# Patient Record
Sex: FEMALE | Race: WHITE | NOT HISPANIC OR LATINO | Employment: OTHER | URBAN - NONMETROPOLITAN AREA
[De-identification: names, ages, dates, MRNs, and addresses within clinical notes are randomized per-mention and may not be internally consistent; named-entity substitution may affect disease eponyms.]

---

## 2017-06-14 ENCOUNTER — TRANSCRIBE ORDERS (OUTPATIENT)
Dept: GENERAL RADIOLOGY | Facility: CLINIC | Age: 67
End: 2017-06-14

## 2017-06-14 DIAGNOSIS — M79.646 PAIN OF FINGER, UNSPECIFIED LATERALITY: Primary | ICD-10-CM

## 2017-06-23 ENCOUNTER — TRANSCRIBE ORDERS (OUTPATIENT)
Dept: GENERAL RADIOLOGY | Facility: CLINIC | Age: 67
End: 2017-06-23

## 2017-06-23 DIAGNOSIS — M79.646 PAIN OF FINGER, UNSPECIFIED LATERALITY: Primary | ICD-10-CM

## 2017-06-27 ENCOUNTER — TRANSCRIBE ORDERS (OUTPATIENT)
Dept: PHYSICAL THERAPY | Facility: HOSPITAL | Age: 67
End: 2017-06-27

## 2017-06-27 DIAGNOSIS — S69.91XA INJURY OF RIGHT INDEX FINGER, INITIAL ENCOUNTER: Primary | ICD-10-CM

## 2017-06-28 ENCOUNTER — HOSPITAL ENCOUNTER (OUTPATIENT)
Dept: PHYSICAL THERAPY | Facility: HOSPITAL | Age: 67
Setting detail: THERAPIES SERIES
Discharge: HOME | End: 2017-06-28

## 2017-06-28 DIAGNOSIS — S69.91XA INJURY OF RIGHT INDEX FINGER, INITIAL ENCOUNTER: Primary | ICD-10-CM

## 2017-06-28 PROCEDURE — 97162 PT EVAL MOD COMPLEX 30 MIN: CPT | Performed by: PHYSICAL THERAPIST

## 2017-06-28 PROCEDURE — 97110 THERAPEUTIC EXERCISES: CPT | Performed by: PHYSICAL THERAPIST

## 2017-06-28 NOTE — THERAPY EVALUATION
Outpatient Physical Therapy Hand Initial Evaluation   [facility]     Patient Name: Bushra Tidwell  : 1950  MRN: [id number]  Today's Date: 2017         Visit Date: 2017  Attendance:  (6 visits approved)  Subjective Improvement: n/a  Next MD Appt: 7/3/17  Recert Date: 17    Therapy Diagnosis: crush injury R IF    There is no problem list on file for this patient.       Past Medical History:   Diagnosis Date   • Arthritis     B knees   • Cancer 2015    skin cancer        Past Surgical History:   Procedure Laterality Date   • APPENDECTOMY     • BILATERAL BREAST REDUCTION Bilateral    • BREAST SURGERY Bilateral     breast reduction   • HERNIA REPAIR      6   • HYSTERECTOMY     • SKIN BIOPSY       Medications: fluoxetine    Visit Dx:    ICD-10-CM ICD-9-CM   1. Injury of right index finger, initial encounter S69.91XA 959.5             Patient History       17 0800          History    Chief Complaint Pain  -SS (r) BS (t) SS (c)      Type of Pain --   index finger  -SS (r) BS (t) SS (c)      Date Current Problem(s) Began --   2.5-3 weeks ago  -SS (r) BS (t) SS (c)      Brief Description of Current Complaint Patient got her R index finger caught in a hydraulic clamp at work 2.5-3 weeks ago. Wears splint constantly.  and lives in a single story home. Patient is currently off work and has been off work for the past 2.5 weeks. Has not been using ice or heat for pain management recently.  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 Hobbies: mow, outdoors, quilting, sewing  -SS (r) BS (t) SS (c)      What clinical tests have you had for this problem? X-ray  -SS (r) BS (t) SS (c)      Results of Clinical Tests negative  -SS (r) BS (t) SS (c)      Pain     Pain Location Finger (Comment which one)   R index  -SS (r) BS (t) SS (c)      Pain at Present 4  -SS (r) BS (t) SS (c)      Pain at Best 0  -SS (r) BS (t) SS (c)      Pain at Worst 8  -SS (r) BS (t) SS (c)      Pain Frequency Intermittent  -SS (r) BS (t) SS (c)      Pain Description Throbbing;Pins and needles  -SS (r) BS (t) SS (c)      What Performance Factors Make the Current Problem(s) WORSE? hitting her finger on something,   -SS (r) BS (t) SS (c)      What Performance Factors Make the Current Problem(s) BETTER? keeping finger above elbow  -SS (r) BS (t) SS (c)      Is your sleep disturbed? Yes  -SS (r) BS (t) SS (c)      Is medication used to assist with sleep? No  -SS (r) BS (t) SS (c)      Difficulties at work? yes  -SS (r) BS (t) SS (c)      Difficulties with ADL's? yes  -SS (r) BS (t) SS (c)      Difficulties with recreational activities? yes  -SS (r) BS (t) SS (c)      Fall Risk Assessment    Any falls in the past year: Yes  -SS (r) BS (t) SS (c)      Does patient have a fear of falling No  -SS (r) BS (t) SS (c)      Daily Activities    Primary Language English  -SS (r) BS (t) SS (c)      Safety    Are you being hurt, hit, or frightened by anyone at home or in your life? No  -SS (r) BS (t) SS (c)      Are you being neglected by a caregiver No  -SS (r) BS (t) SS (c)        User Key  (r) = Recorded By, (t) = Taken By, (c) = Cosigned By    Initials Name Provider Type    SS Gareth Penny, PT Physical Therapist    BS Mile Zuniga, PT Student PT Student             Hand Therapy (last 24 hours)      Hand Eval       06/28/17 0800          Splint Form    Splint Type Finger based  -SS (r) BS (t) SS (c)       Immobilized with PIPJ flexed;DIPJ flexed  -SS (r) BS (t) SS (c)      Select Digits Index   right  -SS (r) BS (t) SS (c)      Splint Purpose Immobilize affected area  -SS (r) BS (t) SS (c)      During (condition to wear splint) --   Continuous  -SS (r) BS (t) SS (c)      Use (daily wear) Continuous  -SS (r) BS (t) SS (c)      Splint Use (overall time to wear splint) Other (Comment)   MD time frame  -SS (r) BS (t) SS (c)      Left Extension AROM    II- MP AROM 10  -SS (r) BS (t) SS (c)      II- PIP AROM 10  -SS (r) BS (t) SS (c)      II- DIP AROM 15  -SS (r) BS (t) SS (c)      II- ARMENTA Left Extension AROM 35  -SS (r) BS (t)      Left Flexion AROM    II- MP AROM 85  -SS (r) BS (t) SS (c)      II- PIP AROM 105  -SS (r) BS (t) SS (c)      II- DIP AROM 75  -SS (r) BS (t) SS (c)      II- ARMENTA Left Flexion AROM 265  -SS (r) BS (t)      Right Extension AROM    II- MP AROM 20  -SS (r) BS (t) SS (c)      II- PIP AROM 10  -SS (r) BS (t) SS (c)      II- DIP AROM 5  -SS (r) BS (t) SS (c)      II- ARMENTA Right Extension AROM 35  -SS (r) BS (t)      Right Flexion AROM    II- MP AROM 30  -SS (r) BS (t) SS (c)      II- PIP AROM 10  -SS (r) BS (t) SS (c)      II- DIP AROM 15  -SS (r) BS (t) SS (c)      II- ARMENTA Right Flexion AROM 55  -SS (r) BS (t)      Hand  Strength     Strength Affected Side --   deferred  -SS (r) BS (t) SS (c)        User Key  (r) = Recorded By, (t) = Taken By, (c) = Cosigned By    Initials Name Provider Type    SS Gareth Penny, PT Physical Therapist    SIS Zuniga, PT Student PT Student              PT Ortho       06/28/17 0800    Subjective Comments    Subjective Comments See Patient History  -SS (r) BS (t) SS (c)    Precautions and Contraindications    Precautions/Limitations no known precautions/limitations  -SS (r) BS (t) SS (c)    Precautions none  -SS (r) BS (t) SS (c)    Contraindications none  -SS (r) BS (t) SS (c)    Subjective Pain    Able to rate subjective pain? yes  -SS (r) BS (t) SS  (c)    Pre-Treatment Pain Level 4  -SS (r) BS (t) SS (c)    Post-Treatment Pain Level 4  -SS    Posture/Observations    Observations Edema;Ecchymosis/bruising  -SS (r) BS (t) SS (c)    Posture/Observations Comments Edema and ecchymosis R IF. Bypasses R IF  -SS (r) BS (t) SS (c)    Sensation    Additional Comments WEST Monofilament Test: R - thumb 2g, IF 0.2g, LF-SF 0.07g; Left - Thumb 2g, IF-RF 0.07g, SF 0.2g  -SS (r) BS (t) SS (c)      User Key  (r) = Recorded By, (t) = Taken By, (c) = Cosigned By    Initials Name Provider Type    JOESPH Penny, PT Physical Therapist    SIS Zuniga, PT Student PT Student                          Therapy Education       06/28/17 0900          Therapy Education    Given HEP  -SS (r) BS (t) SS (c)      Program New   table tops, claw, AROM PIP/DIP flex/ext  -SS (r) BS (t) SS (c)      How Provided Verbal;Demonstration;Written  -SS (r) BS (t) SS (c)      Provided to Patient  -SS (r) BS (t) SS (c)      Level of Understanding Verbalized;Demonstrated  -SS (r) BS (t) SS (c)        User Key  (r) = Recorded By, (t) = Taken By, (c) = Cosigned By    Initials Name Provider Type    JOESPH Penny, PT Physical Therapist    SIS Zuniga, PT Student PT Student                PT OP Goals       06/28/17 1100       PT Short Term Goals    STG Date to Achieve 07/19/17  -SS (r) BS (t) SS (c)     STG 1 Tolerate 45 minute treatment session, no increase in pain.  -SS (r) BS (t) SS (c)     STG 1 Progress New  -SS (r) BS (t) SS (c)     STG 2 Subjective improvement 60% or greater.  -SS (r) BS (t) SS (c)     STG 2 Progress New  -SS (r) BS (t) SS (c)     STG 3 R index finger MP AROM to 70 deg or greater.  -SS     STG 3 Progress New  -SS (r) BS (t) SS (c)     STG 4 R index finger PIP AROM to 90 deg or greater.  -SS     STG 4 Progress New  -SS (r) BS (t) SS (c)     STG 5 R index finger DIP AROM to 50 deg or greater.  -SS (r) BS (t) SS (c)     STG 5 Progress New  -SS (r) BS (t) SS (c)      STG 6 R  strength within 5-10 lbs of L  strength.  -SS (r) BS (t) SS (c)     STG 6 Progress New  -SS (r) BS (t) SS (c)     STG 7 Quick DASH to 20% or less.  -SS (r) BS (t) SS (c)     STG 7 Progress New  -SS (r) BS (t) SS (c)     Long Term Goals    LTG Date to Achieve --   deferred at this time  -SS (r) BS (t) SS (c)     Time Calculation    PT Goal Re-Cert Due Date 07/19/17  -SS (r) BS (t) SS (c)       User Key  (r) = Recorded By, (t) = Taken By, (c) = Cosigned By    Initials Name Provider Type    SS Gareth Penny, PT Physical Therapist    SIS Zuniga, PT Student PT Student                PT Assessment/Plan       06/28/17 0800       PT Assessment    Functional Limitations Performance in self-care ADL;Performance in sport activities;Performance in work activities;Limitation in home management;Performance in leisure activities  -SS (r) BS (t) SS (c)     Impairments Dexterity;Edema;Joint mobility;Motor function;Muscle strength;Pain;Sensation  -SS (r) BS (t) SS (c)     Assessment Comments Patient presents to the clinic with edema and ecchymosis of the right index finger secondary to an injury. She lacks ROM in the digit and sensation is decreased as well. Patient would benefit from skilled therapy to increase ROM, decrease pain, and return to normal ADLs and work.  -SS (r) BS (t) SS (c)     Rehab Potential Good  -SS (r) BS (t) SS (c)     Patient/caregiver participated in establishment of treatment plan and goals Yes  -SS (r) BS (t) SS (c)     Patient would benefit from skilled therapy intervention Yes  -SS (r) BS (t) SS (c)     PT Plan    PT Frequency 2x/week  -SS (r) BS (t) SS (c)     Predicted Duration of Therapy Intervention (days/wks) 3-4 weeks  -SS (r) BS (t) SS (c)     Planned CPT's? PT EVAL LOW COMPLEXITY: 82501;PT SELF CARE/HOME MGMT/TRAIN EA 15: 70330;PT HOT OR COLD PACK TREAT [facility]ARE;PT PARAFFIN BATH: 60187  -SS (r) BS (t) SS (c)     Physical Therapy Interventions (Optional Details) fine  motor skills;home exercise program;manual therapy techniques;modalities;patient/family education;ROM (Range of Motion);strengthening  -SS (r) BS (t) SS (c)     PT Plan Comments Continue AROM exercises, fluidotherapy, possibly paraffin.  -SS (r) BS (t) SS (c)       User Key  (r) = Recorded By, (t) = Taken By, (c) = Cosigned By    Initials Name Provider Type    SS Gareth Penny, PT Physical Therapist    SSI Zuniga, PT Student PT Student                  Exercises       06/28/17 0800          Subjective Comments    Subjective Comments See Patient History  -SS (r) BS (t) SS (c)      Subjective Pain    Able to rate subjective pain? yes  -SS (r) BS (t) SS (c)      Pre-Treatment Pain Level 4  -SS (r) BS (t) SS (c)      Post-Treatment Pain Level 4  -SS      Exercise 1    Exercise Name 1 Fluidotherapy for desensitization and AROM  -SS (r) BS (t) SS (c)      Time (Minutes) 1 15 mins  -SS (r) BS (t) SS (c)      Exercise 2    Exercise Name 2 Table tops  -SS (r) BS (t) SS (c)      Cueing 2 Demo  -SS (r) BS (t) SS (c)      Sets 2 1  -SS (r) BS (t) SS (c)      Reps 2 10  -SS (r) BS (t) SS (c)      Exercise 3    Exercise Name 3 Claw  -SS (r) BS (t) SS (c)      Cueing 3 Demo  -SS (r) BS (t) SS (c)      Sets 3 1  -SS (r) BS (t) SS (c)      Reps 3 10  -SS (r) BS (t) SS (c)      Exercise 4    Exercise Name 4 AROM finger DIP flex/ext  -SS (r) BS (t) SS (c)      Cueing 4 Demo  -SS (r) BS (t) SS (c)      Sets 4 1  -SS (r) BS (t) SS (c)      Reps 4 10  -SS (r) BS (t) SS (c)      Exercise 5    Exercise Name 5 AROM finger PIP flex/ext  -SS (r) BS (t) SS (c)      Cueing 5 Demo  -SS (r) BS (t) SS (c)      Sets 5 1  -SS (r) BS (t) SS (c)      Reps 5 10  -SS (r) BS (t) SS (c)        User Key  (r) = Recorded By, (t) = Taken By, (c) = Cosigned By    Initials Name Provider Type    SS Gareth Penny, PT Physical Therapist    SIS Zuniga, PT Student PT Student                             Outcome Measures       06/28/17 0800           Quick DASH    Open a tight or new jar. 3  -SS (r) BS (t) SS (c)      Do heavy household chores (e.g., wash walls, wash floors) 3  -SS (r) BS (t) SS (c)      Carry a shopping bag or briefcase 3  -SS (r) BS (t) SS (c)      Wash your back 3  -SS (r) BS (t) SS (c)      Use a knife to cut food 3  -SS (r) BS (t) SS (c)      Recreational activities in which you take some force or impact through your arm, should or hand (e.g. golf, hammering, tennis, etc.) 5  -SS (r) BS (t) SS (c)      During the past week, to what extent has your arm, shoulder, or hand problem interfered with your normal social activities with family, friends, neighbors or groups? 4  -SS (r) BS (t) SS (c)      During the past week, were you limited in your work or other regular daily activities as a result of your arm, shoulder or hand problem? 4  -SS (r) BS (t) SS (c)      Arm, Shoulder, or hand pain 3  -SS (r) BS (t) SS (c)      Tingling (pins and needles) in your arm, shoulder, or hand 3  -SS (r) BS (t) SS (c)      During the past week, how much difficulty have you had sleeping because of the pain in your arm, shoulder or hand? 3  -SS (r) BS (t) SS (c)      Number of Questions Answered 11  -SS (r) BS (t) SS (c)      Quick DASH Score 59.09  -SS (r) BS (t)      Functional Assessment    Outcome Measure Options Quick DASH  -SS (r) BS (t) SS (c)        User Key  (r) = Recorded By, (t) = Taken By, (c) = Cosigned By    Initials Name Provider Type     Gareth Penny, PT Physical Therapist    SIS Zuniga, PT Student PT Student            Time Calculation:   Start Time: 0804  Stop Time: 0905  Time Calculation (min): 61 min     Therapy Charges for Today     Code Description Service Date Service Provider Modifiers Qty    30483481466 HC PT EVAL MOD COMPLEXITY 3 6/28/2017 Gareth Penny, PT GP 1    73880669805 HC PT THER PROC EA 15 MIN 6/28/2017 Gareth Penny, PT GP 1                   Gareth Penny, PT  6/28/2017

## 2017-07-05 ENCOUNTER — HOSPITAL ENCOUNTER (OUTPATIENT)
Dept: PHYSICAL THERAPY | Facility: HOSPITAL | Age: 67
Setting detail: THERAPIES SERIES
Discharge: HOME | End: 2017-07-05

## 2017-07-05 DIAGNOSIS — S69.91XA INJURY OF RIGHT INDEX FINGER, INITIAL ENCOUNTER: Primary | ICD-10-CM

## 2017-07-05 PROCEDURE — 97110 THERAPEUTIC EXERCISES: CPT

## 2017-07-05 NOTE — THERAPY TREATMENT NOTE
Outpatient Physical Therapy Ortho Treatment Note  [facility]     Patient Name: Bushra Tidwell  : 1950  MRN: [id number]  Today's Date: 2017      Visit Date: 2017      Subjective Improvement 25%  Visits 2/2  Visits Approved 6  RTMD 7-  Recert Date 2017    Right Index Finger Injury    Visit Dx:    ICD-10-CM ICD-9-CM   1. Injury of right index finger, initial encounter S69.91XA 959.5       There is no problem list on file for this patient.       Past Medical History:   Diagnosis Date   • Arthritis     B knees   • Cancer 2015    skin cancer        Past Surgical History:   Procedure Laterality Date   • APPENDECTOMY     • BILATERAL BREAST REDUCTION Bilateral    • BREAST SURGERY Bilateral     breast reduction   • HERNIA REPAIR      6   • HYSTERECTOMY     • SKIN BIOPSY                               PT Assessment/Plan       17 1110       PT Assessment    Assessment Comments appears compliant with HEP  -CP     PT Plan    PT Frequency 2x/week  -CP     Predicted Duration of Therapy Intervention (days/wks) 4 weeks  -CP     PT Plan Comments Cont with POC  -CP       User Key  (r) = Recorded By, (t) = Taken By, (c) = Cosigned By    Initials Name Provider Type    CP Sarai Hay PTA Physical Therapy Assistant                Modalities       17 0900          Subjective Comments    Subjective Comments Patient states that she is not to wear the splint, only if her finger starts to swell or it becomes painful.  -CP      Subjective Pain    Able to rate subjective pain? yes  -CP      Pre-Treatment Pain Level 3  -CP      Parrafin    Paraffin [id number] Location right hand  -CP      Rx Minutes 15  -CP        User Key  (r) = Recorded By, (t) = Taken By, (c) = Cosigned By    Initials Name Provider Type    CP Sarai Hay PTA Physical Therapy Assistant                Exercises       17 0900          Subjective Comments    Subjective Comments Patient states that she is not to wear  the splint, only if her finger starts to swell or it becomes painful.  -CP      Subjective Pain    Able to rate subjective pain? yes  -CP      Pre-Treatment Pain Level 3  -CP      Exercise 1    Exercise Name 1 Table tops  -CP      Reps 1 20  -CP      Exercise 2    Exercise Name 2 claw  -CP      Reps 2 20  -CP      Exercise 3    Exercise Name 3 Composite fist  -CP      Reps 3 20  -CP      Exercise 4    Exercise Name 4 Finger ext off of table  -CP      Sets 4 2  -CP      Reps 4 10  -CP      Exercise 5    Exercise Name 5 In and out  -CP      Reps 5 20  -CP      Exercise 6    Exercise Name 6 Ball squeezes  -CP      Sets 6 2  -CP      Reps 6 10  -CP      Exercise 7    Exercise Name 7 AROM finger fl  -CP      Sets 7 2  -CP      Reps 7 10  -CP      Exercise 8    Exercise Name 8 Fluido  -CP      Time (Minutes) 8 15  -CP        User Key  (r) = Recorded By, (t) = Taken By, (c) = Cosigned By    Initials Name Provider Type    CP Sarai Hay PTA Physical Therapy Assistant                        Manual Rx (last 36 hours)      Manual Treatments       07/05/17 1100          Manual Rx 1    Manual Rx 1 Location PROM right index finger fl all jts  -CP      Manual Rx 1 Duration 8  -CP        User Key  (r) = Recorded By, (t) = Taken By, (c) = Cosigned By    Initials Name Provider Type    CP Sarai Hay PTA Physical Therapy Assistant                PT OP Goals       07/05/17 1100       PT Short Term Goals    STG Date to Achieve 07/19/17  -CP     STG 1 Tolerate 45 minute treatment session, no increase in pain.  -CP     STG 1 Progress Progressing  -CP     STG 2 Subjective improvement 60% or greater.  -CP     STG 2 Progress Not Met  -CP     STG 3 R index finger MP AROM to 70 deg or greater.  -CP     STG 3 Progress Progressing  -CP     STG 4 R index finger PIP AROM to 90 deg or greater.  -CP     STG 4 Progress Progressing  -CP     STG 5 R index finger DIP AROM to 50 deg or greater.  -CP     STG 5 Progress Progressing  -CP     STG  6 R  strength within 5-10 lbs of L  strength.  -CP     STG 6 Progress Not Met  -CP     STG 7 Quick DASH to 20% or less.  -CP     STG 7 Progress Not Met  -CP     Long Term Goals    LTG Date to Achieve --   deferred at this time  -CP     Time Calculation    PT Goal Re-Cert Due Date 07/19/17  -CP       User Key  (r) = Recorded By, (t) = Taken By, (c) = Cosigned By    Initials Name Provider Type    CP Sarai Hay PTA Physical Therapy Assistant                Therapy Education       07/05/17 1103          Therapy Education    Given HEP   finger ext off of table, table top, clas, composite fist, ball squeezes  -CP      Program Reinforced  -CP      How Provided Verbal;Demonstration;Written  -CP      Provided to Patient  -CP      Level of Understanding Verbalized;Demonstrated  -CP        User Key  (r) = Recorded By, (t) = Taken By, (c) = Cosigned By    Initials Name Provider Type    CP Sarai Hay PTA Physical Therapy Assistant                Time Calculation:   Start Time: 1015  Stop Time: 1105  Time Calculation (min): 50 min  Total Timed Code Minutes- PT: 50 minute(s)    Therapy Charges for Today     Code Description Service Date Service Provider Modifiers Qty    08679516005 HC PT THER PROC EA 15 MIN 7/5/2017 Sarai Hay PTA GP 3                    Sarai Hay PTA  7/5/2017

## 2017-07-07 ENCOUNTER — HOSPITAL ENCOUNTER (OUTPATIENT)
Dept: PHYSICAL THERAPY | Facility: HOSPITAL | Age: 67
Setting detail: THERAPIES SERIES
Discharge: HOME | End: 2017-07-07

## 2017-07-07 DIAGNOSIS — S69.91XA INJURY OF RIGHT INDEX FINGER, INITIAL ENCOUNTER: Primary | ICD-10-CM

## 2017-07-07 PROCEDURE — 97018 PARAFFIN BATH THERAPY: CPT

## 2017-07-07 PROCEDURE — 97110 THERAPEUTIC EXERCISES: CPT

## 2017-07-07 NOTE — THERAPY TREATMENT NOTE
Outpatient Physical Therapy Ortho Treatment Note  [facility]     Patient Name: Bushra Tidwell  : 1950  MRN: [id number]  Today's Date: 2017      Visit Date: 2017     Subjective Improvement: 30%  Attendance:  3/3 ( 6 visits approved)  Next MD Visit : 07/10/17  Recert Date:  17      Therapy Diagnosis:  R index Finger Injury          Visit Dx:    ICD-10-CM ICD-9-CM   1. Injury of right index finger, initial encounter S69.91XA 959.5       There is no problem list on file for this patient.       Past Medical History:   Diagnosis Date   • Arthritis     B knees   • Cancer 2015    skin cancer        Past Surgical History:   Procedure Laterality Date   • APPENDECTOMY     • BILATERAL BREAST REDUCTION Bilateral    • BREAST SURGERY Bilateral     breast reduction   • HERNIA REPAIR      6   • HYSTERECTOMY     • SKIN BIOPSY               PT Ortho       17 1500    Right Fingers    MCP Flexion AROM Deficit --  -KH    PIP Flexion AROM Deficit --  -KH    DIP Flexion AROM Deficit --  -      17 1415    Precautions and Contraindications    Precautions/Limitations no known precautions/limitations  -    Right Fingers    MCP Flexion AROM Deficit 95 Index finger  -KH    PIP Flexion AROM Deficit 95 Index Finger  -KH    DIP Flexion AROM Deficit 45 Index Finger  -KH    Hand    Hand Manual Muscle Testing Detail  R 32#; L 44#  -      User Key  (r) = Recorded By, (t) = Taken By, (c) = Cosigned By    Initials Name Provider Type    SERAFIN Morris PTA Physical Therapy Assistant                            PT Assessment/Plan       17 1415       PT Assessment    Assessment Comments velcro block pinch tiring to patient and had minimal pain at the end but pain decreases once activities stopped.   -     PT Plan    PT Frequency 2x/week  -     Predicted Duration of Therapy Intervention (days/wks) 4 weeks  -     PT Plan Comments Check  strength next; Continue with PT POC; Increase AROM ans  strength in the right hand as able.   -       User Key  (r) = Recorded By, (t) = Taken By, (c) = Cosigned By    Initials Name Provider Type    SERAFIN Morris PTA Physical Therapy Assistant                Modalities       07/07/17 1415          Parlaura    Paraffin [id number] Location Right Hand  -KH      Rx Minutes 12'  -KH        User Key  (r) = Recorded By, (t) = Taken By, (c) = Cosigned By    Initials Name Provider Type    SERAFIN Morris PTA Physical Therapy Assistant                Exercises       07/07/17 1415          Subjective Comments    Subjective Comments Finger still smarts with pressure is appplied to that jt in the right finger; pt reports that her finger is getting better  -      Subjective Pain    Able to rate subjective pain? yes  -      Pre-Treatment Pain Level 2  -KH      Exercise 1    Exercise Name 1 table tops  -KH      Reps 1 20  -KH      Exercise 2    Exercise Name 2 claw  -KH      Reps 2 20  -KH      Exercise 3    Exercise Name 3 composite fist  -KH      Reps 3 20  -KH      Exercise 4    Exercise Name 4 velcro block pinch & pull  -KH      Sets 4 1  -KH      Exercise 5    Exercise Name 5 web   -KH      Resistance 5 Red  -KH      Time (Minutes) 5 3'  -KH      Exercise 6    Exercise Name 6 PEG board  -KH      Time (Minutes) 6 5'  -KH      Exercise 7    Exercise Name 7 Velcro Lg roll turn  -KH      Sets 7 1  -KH      Reps 7 10  -KH      Exercise 8    Exercise Name 8 Velcro Key Turn  -KH      Sets 8 1  -KH      Reps 8 10  -KH        User Key  (r) = Recorded By, (t) = Taken By, (c) = Cosigned By    Initials Name Provider Type    SERAFIN Morris PTA Physical Therapy Assistant                               PT OP Goals       07/07/17 1415       PT Short Term Goals    STG Date to Achieve 07/19/17  -     STG 1 Tolerate 45 minute treatment session, no increase in pain.  -     STG 1 Progress Progressing  -     STG 2 Subjective improvement 60% or greater.  -     STG 2 Progress Not Met  -      STG 3 R index finger MP AROM to 70 deg or greater.  -     STG 3 Progress Met  -     STG 4 R index finger PIP AROM to 90 deg or greater.  -     STG 4 Progress Met  -     STG 5 R index finger DIP AROM to 50 deg or greater.  -     STG 5 Progress Progressing  -     STG 6 R  strength within 5-10 lbs of L  strength.  -     STG 6 Progress Not Met  -     STG 7 Quick DASH to 20% or less.  -     STG 7 Progress Not Met  -     Long Term Goals    LTG Date to Achieve --   deferred at this time  -     Time Calculation    PT Goal Re-Cert Due Date 07/19/17  -       User Key  (r) = Recorded By, (t) = Taken By, (c) = Cosigned By    Initials Name Provider Type    SERAFIN Morris PTA Physical Therapy Assistant                    Time Calculation:   Start Time: 1415  Stop Time: 1500  Time Calculation (min): 45 min  Total Timed Code Minutes- PT: 45 minute(s)    Therapy Charges for Today     Code Description Service Date Service Provider Modifiers Qty    [id number]  PT THER PROC EA 15 MIN 7/7/2017 Olivia Morris PTA GP 2    [id number] HC PT PARAFFIN BATH 7/7/2017 Olivia Morris PTA GP 1                    Olivia Morris PTA  7/7/2017

## 2017-07-11 ENCOUNTER — HOSPITAL ENCOUNTER (OUTPATIENT)
Dept: PHYSICAL THERAPY | Facility: HOSPITAL | Age: 67
Setting detail: THERAPIES SERIES
End: 2017-07-11

## 2017-07-12 ENCOUNTER — HOSPITAL ENCOUNTER (OUTPATIENT)
Dept: PHYSICAL THERAPY | Facility: HOSPITAL | Age: 67
Setting detail: THERAPIES SERIES
Discharge: HOME | End: 2017-07-12

## 2017-07-12 DIAGNOSIS — S69.91XA INJURY OF RIGHT INDEX FINGER, INITIAL ENCOUNTER: Primary | ICD-10-CM

## 2017-07-12 PROCEDURE — 97110 THERAPEUTIC EXERCISES: CPT

## 2017-07-12 NOTE — THERAPY TREATMENT NOTE
Outpatient Physical Therapy Ortho Treatment Note  [facility]     Patient Name: Bushra Tidwell  : 1950  MRN: [id number]  Today's Date: 2017      Visit Date: 2017     Subjective Improvement 50%  Visits 4/4  Visits approved 6  RTMD 2017  recert [id number]    Right Index finger injury    Visit Dx:    ICD-10-CM ICD-9-CM   1. Injury of right index finger, initial encounter S69.91XA 959.5       There is no problem list on file for this patient.       Past Medical History:   Diagnosis Date   • Arthritis     B knees   • Cancer 2015    skin cancer        Past Surgical History:   Procedure Laterality Date   • APPENDECTOMY     • BILATERAL BREAST REDUCTION Bilateral    • BREAST SURGERY Bilateral     breast reduction   • HERNIA REPAIR      6   • HYSTERECTOMY     • SKIN BIOPSY              Hand Therapy (last 24 hours)      Hand Eval       17 1600          Hand  Strength     Strength Affected Side Right  -CP       Strength Right    # Reps 3  -CP      Right  Test 1 24  -CP      Right  Test 2 22  -CP      Right  Test 3 24  -CP       Strength Average Right 23.33  -CP        User Key  (r) = Recorded By, (t) = Taken By, (c) = Cosigned By    Initials Name Provider Type    CP Sarai Hay PTA Physical Therapy Assistant                          PT Assessment/Plan       17 1656       PT Assessment    Assessment Comments no increase pain with therapy this date.  Patient appears to be progressing nicely  -CP     PT Plan    PT Frequency 2x/week  -CP     Predicted Duration of Therapy Intervention (days/wks) 1 week  -CP     PT Plan Comments Recheck scheduled for next week.  will need to request additional visits.  take AROM need visits.  Box carry  -CP       User Key  (r) = Recorded By, (t) = Taken By, (c) = Cosigned By    Initials Name Provider Type    CP Sarai Hay PTA Physical Therapy Assistant                Modalities       17 1500           [facility] [id number] Location right hand   -CP      Rx Minutes 12  -CP        User Key  (r) = Recorded By, (t) = Taken By, (c) = Cosigned By    Initials Name Provider Type    CP Sarai Hay PTA Physical Therapy Assistant                Exercises       07/12/17 1500          Subjective Comments    Subjective Comments Patient went to MD yesterday and MD felt that she would be ready to return to workin 2 weeks.  He will let PT make that decision.  -CP      Subjective Pain    Able to rate subjective pain? yes  -CP      Pre-Treatment Pain Level 1  -CP      Post-Treatment Pain Level 0  -CP      Exercise 1    Exercise Name 1 table tops  -CP      Reps 1 20  -CP      Exercise 2    Exercise Name 2 Claw  -CP      Reps 2 20  -CP      Exercise 3    Exercise Name 3 Table tops  -CP      Reps 3 20  -CP      Exercise 4    Exercise Name 4 Composite fist  -CP      Reps 4 20  -CP      Exercise 5    Exercise Name 5 Velcro large roll and ket  -CP      Sets 5 1  -CP      Reps 5 10  -CP      Exercise 6    Exercise Name 6 box screws  -CP      Time (Minutes) 6 3  -CP      Exercise 7    Exercise Name 7 composit fist  -CP      Equipment 7 Theraputty  -CP      Resistance 7 Yellow  -CP      Reps 7 30  -CP      Exercise 8    Exercise Name 8 Beans and rice  -CP      Time (Minutes) 8 4  -CP        User Key  (r) = Recorded By, (t) = Taken By, (c) = Cosigned By    Initials Name Provider Type    CP Sarai Hay PTA Physical Therapy Assistant                               PT OP Goals       07/12/17 1600       PT Short Term Goals    STG Date to Achieve 07/19/17  -CP     STG 1 Tolerate 45 minute treatment session, no increase in pain.  -CP     STG 1 Progress Met  -CP     STG 2 Subjective improvement 60% or greater.  -CP     STG 2 Progress Not Met  -CP     STG 3 R index finger MP AROM to 70 deg or greater.  -CP     STG 3 Progress Met  -CP     STG 4 R index finger PIP AROM to 90 deg or greater.  -CP     STG 4 Progress Met  -CP     STG 5 R  index finger DIP AROM to 50 deg or greater.  -CP     STG 5 Progress Progressing  -CP     STG 6 R  strength within 5-10 lbs of L  strength.  -CP     STG 6 Progress Not Met  -CP     STG 7 Quick DASH to 20% or less.  -CP     STG 7 Progress Not Met  -CP     Long Term Goals    LTG Date to Achieve --   deferred at this time  -CP     Time Calculation    PT Goal Re-Cert Due Date 07/19/17  -CP       User Key  (r) = Recorded By, (t) = Taken By, (c) = Cosigned By    Initials Name Provider Type    CP Sarai Hay PTA Physical Therapy Assistant                Therapy Education       07/12/17 1655          Therapy Education    Given HEP   composite  with yellow putty  -CP      Program New  -CP      How Provided Verbal;Demonstration  -CP      Provided to Patient  -CP      Level of Understanding Verbalized;Demonstrated  -CP        User Key  (r) = Recorded By, (t) = Taken By, (c) = Cosigned By    Initials Name Provider Type    CP Sarai Hay PTA Physical Therapy Assistant                Time Calculation:   Start Time: 1555  Stop Time: 1645  Time Calculation (min): 50 min  Total Timed Code Minutes- PT: 50 minute(s)    Therapy Charges for Today     Code Description Service Date Service Provider Modifiers Qty    42715844265 HC PT THER PROC EA 15 MIN 7/12/2017 Sarai Hay PTA GP 3                    Sarai Hay PTA  7/12/2017

## 2017-07-13 ENCOUNTER — HOSPITAL ENCOUNTER (OUTPATIENT)
Dept: PHYSICAL THERAPY | Facility: HOSPITAL | Age: 67
Setting detail: THERAPIES SERIES
Discharge: HOME | End: 2017-07-13

## 2017-07-13 DIAGNOSIS — S69.91XA INJURY OF RIGHT INDEX FINGER, INITIAL ENCOUNTER: Primary | ICD-10-CM

## 2017-07-13 PROCEDURE — 97110 THERAPEUTIC EXERCISES: CPT

## 2017-07-13 PROCEDURE — 97018 PARAFFIN BATH THERAPY: CPT

## 2017-07-13 NOTE — THERAPY TREATMENT NOTE
Outpatient Physical Therapy Ortho Treatment Note  [facility]     Patient Name: Bushra Tidwell  : 1950  MRN: [id number]  Today's Date: 2017      Visit Date: 2017     Subjective Improvement: 50%  Attendance:  5/5 (6 visits approved)  Next MD Visit : 2017   Recert Date:  2017      Therapy Diagnosis:  Injury of R index finger, initial encounter        Visit Dx:    ICD-10-CM ICD-9-CM   1. Injury of right index finger, initial encounter S69.91XA 959.5       There is no problem list on file for this patient.       Past Medical History:   Diagnosis Date   • Arthritis     B knees   • Cancer 2015    skin cancer        Past Surgical History:   Procedure Laterality Date   • APPENDECTOMY     • BILATERAL BREAST REDUCTION Bilateral    • BREAST SURGERY Bilateral     breast reduction   • HERNIA REPAIR      6   • HYSTERECTOMY     • SKIN BIOPSY              Hand Therapy (last 24 hours)      Hand Eval       17 1558          Subjective Pain    Post-Treatment Pain Level (P)  3  -MO      Right Extension AROM    II- MP AROM (P)  0  -MO      II- PIP AROM (P)  0  -MO      II- DIP AROM (P)  -5  -MO      II- ARMENTA Right Extension AROM (P)  -5  -MO      Right Flexion AROM    II- MP AROM (P)  90  -MO      II- PIP AROM (P)  55  -MO      II- DIP AROM (P)  30  -MO      II- ARMENTA Right Flexion AROM (P)  175  -MO      Hand  Strength     Strength Affected Side (P)  Right;Left  -MO       Strength Right    # Reps (P)  3  -MO      Right  Test 1 (P)  33  -MO      Right  Test 2 (P)  30  -MO      Right  Test 3 (P)  28  -MO       Strength Average Right (P)  30.33  -MO       Strength Left    # Reps (P)  3  -MO      Left  Test 1 (P)  45  -MO      Left  Test 2 (P)  50  -MO      Left  Test 3 (P)  43  -MO       Strength Average Left (P)  46  -MO        User Key  (r) = Recorded By, (t) = Taken By, (c) = Cosigned By    Initials Name Provider Type    JOS Baca, PTA  Student PTA Student                          PT Assessment/Plan       07/13/17 1558 07/12/17 1656    PT Assessment    Assessment Comments (P)  Pt tolerated treatment well.  Pt had some pain toward the end of the pinching exercises.  Pt experienced some fatigue   -MO no increase pain with therapy this date.  Patient appears to be progressing nicely  -CP    PT Plan    PT Frequency (P)  2x/week  -MO 2x/week  -CP    Predicted Duration of Therapy Intervention (days/wks) (P)  1 week  -MO 1 week  -CP    PT Plan Comments (P)  Request additional visits next week.  Check and work on  strength.  -MO Recheck scheduled for next week.  will need to request additional visits.  take AROM need visits.  Box carry  -CP      User Key  (r) = Recorded By, (t) = Taken By, (c) = Cosigned By    Initials Name Provider Type    CP Sarai Hay PTA Physical Therapy Assistant    JOS Baca, SHARMIN Student PTA Student                Modalities       07/13/17 1558          [facility] [address] Location (P)  R hand  -MO      Rx Minutes (P)  15  -MO        User Key  (r) = Recorded By, (t) = Taken By, (c) = Cosigned By    Initials Name Provider Type    JOS Baca, PTA Student PTA Student                Exercises       07/13/17 1558 07/13/17 1500       Subjective Comments    Subjective Comments (P)  Pt states she is in some pain today but not enough to take a pain pill  -MO      Subjective Pain    Able to rate subjective pain? (P)  yes  -MO (P)  --  -MO     Pre-Treatment Pain Level (P)  4  -MO      Post-Treatment Pain Level (P)  3  -MO      Exercise 1    Exercise Name 1 (P)  table tops  -MO      Reps 1 (P)  20  -MO      Exercise 2    Exercise Name 2 (P)  claw  -MO      Reps 2 (P)  20  -MO      Exercise 3    Exercise Name 3 (P)  composite fist  -MO      Reps 3 (P)  20  -MO      Exercise 4    Exercise Name 4 (P)  cards 3 pinch  -MO      Reps 4 (P)  20  -MO      Exercise 5    Exercise Name 5 (P)  velcro large roll  -MO       Sets 5 (P)  1  -MO      Reps 5 (P)  10  -MO      Exercise 6    Exercise Name 6 (P)  Clothes pin w/ pink color 3 pinch  -MO      Sets 6 (P)  2  -MO      Reps 6 (P)  10  -MO      Exercise 7    Exercise Name 7 (P)  Purdue peg board  -MO      Time (Minutes) 7 (P)  5  -MO        User Key  (r) = Recorded By, (t) = Taken By, (c) = Cosigned By    Initials Name Provider Type    JOS Baca PTA Student PTA Student                               PT OP Goals       07/13/17 1558       PT Short Term Goals    STG Date to Achieve (P)  07/19/17  -MO     STG 1 (P)  Tolerate 45 minute treatment session, no increase in pain.  -MO     STG 1 Progress (P)  Met  -MO     STG 2 (P)  Subjective improvement 60% or greater.  -MO     STG 2 Progress (P)  Not Met  -MO     STG 3 (P)  R index finger MP AROM to 70 deg or greater.  -MO     STG 3 Progress (P)  Met  -MO     STG 4 (P)  R index finger PIP AROM to 90 deg or greater.  -MO     STG 4 Progress (P)  Met  -MO     STG 5 (P)  R index finger DIP AROM to 50 deg or greater.  -MO     STG 5 Progress (P)  Progressing  -MO     STG 6 (P)  R  strength within 5-10 lbs of L  strength.  -MO     STG 6 Progress (P)  Not Met  -MO     STG 7 (P)  Quick DASH to 20% or less.  -MO     STG 7 Progress (P)  Not Met  -MO     Long Term Goals    LTG Date to Achieve (P)  --   deferred at this time  -MO     Time Calculation    PT Goal Re-Cert Due Date (P)  07/19/17  -MO       User Key  (r) = Recorded By, (t) = Taken By, (c) = Cosigned By    Initials Name Provider Type    JOS Baca PTA Student PTA Student                Therapy Education       07/12/17 1655          Therapy Education    Given HEP   composite  with yellow putty  -CP      Program New  -CP      How Provided Verbal;Demonstration  -CP      Provided to Patient  -CP      Level of Understanding Verbalized;Demonstrated  -CP        User Key  (r) = Recorded By, (t) = Taken By, (c) = Cosigned By    Initials Name Provider Type    CP Sarai  BETSY Hay PTA Physical Therapy Assistant                Time Calculation:   Start Time: (P) 1558  Stop Time: (P) 1644  Time Calculation (min): (P) 46 min  Total Timed Code Minutes- PT: (P) 46 minute(s)    Therapy Charges for Today     Code Description Service Date Service Provider Modifiers Qty    99796359179 HC PT PARAFFIN BATH 7/13/2017 Stefania Baca PTA Student GP 1    01306304929 HC PT THER PROC EA 15 MIN 7/13/2017 Stefania Baca PTA Student GP 2                    Stefania Baca PTA Student  7/13/2017

## 2017-07-19 ENCOUNTER — HOSPITAL ENCOUNTER (OUTPATIENT)
Dept: PHYSICAL THERAPY | Facility: HOSPITAL | Age: 67
Setting detail: THERAPIES SERIES
Discharge: HOME | End: 2017-07-19

## 2017-07-19 DIAGNOSIS — S69.91XA INJURY OF RIGHT INDEX FINGER, INITIAL ENCOUNTER: Primary | ICD-10-CM

## 2017-07-19 PROCEDURE — 97018 PARAFFIN BATH THERAPY: CPT | Performed by: PHYSICAL THERAPIST

## 2017-07-19 PROCEDURE — 97110 THERAPEUTIC EXERCISES: CPT | Performed by: PHYSICAL THERAPIST

## 2017-07-20 NOTE — THERAPY PROGRESS REPORT/RE-CERT
Outpatient Physical Therapy Hand Progress Note   [facility]     Patient Name: Bushra Tidwell  : 1950  MRN: [id number]  Today's Date: 2017         Visit Date: 2017  Attendance:  (6 visits approved)  Subjective Improvement: 80%  Next MD Appt: 17  Recert Date: 17     Therapy Diagnosis: crush injury R IF     There is no problem list on file for this patient.       Past Medical History:   Diagnosis Date   • Arthritis     B knees   • Cancer 2015    skin cancer        Past Surgical History:   Procedure Laterality Date   • APPENDECTOMY     • BILATERAL BREAST REDUCTION Bilateral    • BREAST SURGERY Bilateral     breast reduction   • HERNIA REPAIR      6   • HYSTERECTOMY     • SKIN BIOPSY           Visit Dx:    ICD-10-CM ICD-9-CM   1. Injury of right index finger, initial encounter S69.91XA 959.5     Changes in Medications: levofloxacin added to medication list  Changes in MD Orders: none noted  Number of Work Days Lost: none           Hand Therapy (last 24 hours)      Hand Eval       17 1500          Subjective Comments    Subjective Comments Saw Dr. Lancaster yesterday. Goes back to him on 17. Continued restrictions due to concern about ability to use knife to remove glue from headliner. Movement is much better than when therapy started. Able to use hand normally at home. Catches herself babying the right index finger. Has been prescribed an levofloxacin for a kidney infection. 80% subjective improvement  -SS      Subjective Pain    Able to rate subjective pain? yes  -SS      Pre-Treatment Pain Level 0  -SS      Right Extension AROM    II- MP AROM 15  -SS      II- PIP AROM 15  -SS      II- DIP AROM 5  -SS      II- ARMENTA Right Extension AROM 35  -SS      Right Flexion AROM    II- MP AROM 95  -SS      II- PIP AROM 105  -SS      II- DIP AROM 65  -SS      II- ARMENTA Right Flexion AROM 265  -SS       Strength Right    # Reps 3  -SS      Right Rung 2  -SS      Right  Test 1 40   -SS      Right  Test 2 30  -SS      Right  Test 3 35  -SS       Strength Average Right 35  -SS       Strength Left    # Reps 3  -SS      Left Rung 2  -SS      Left  Test 1 50  -SS      Left  Test 2 45  -SS      Left  Test 3 50  -SS       Strength Average Left 48.33  -SS        User Key  (r) = Recorded By, (t) = Taken By, (c) = Cosigned By    Initials Name Provider Type    JOESPH Penny, PT Physical Therapist              PT Ortho       07/19/17 1600    Special Tests/Palpation    Special Tests/Palpation --  -SS      07/19/17 1500    Special Tests/Palpation    Special Tests/Palpation --   slight TTP dorsal PIP and along med and lat finger  -SS      User Key  (r) = Recorded By, (t) = Taken By, (c) = Cosigned By    Initials Name Provider Type    JOESPH Penny, PT Physical Therapist                          Therapy Education       07/19/17 1600          Therapy Education    Given HEP  -SS      Program Progressed   dispensed red putty  -SS      How Provided Verbal;Demonstration  -SS      Provided to Patient  -SS      Level of Understanding Verbalized;Demonstrated  -SS        User Key  (r) = Recorded By, (t) = Taken By, (c) = Cosigned By    Initials Name Provider Type    JOESPH Penny, PT Physical Therapist                PT OP Goals       07/19/17 1500       PT Short Term Goals    STG Date to Achieve 07/19/17  -SS     STG 1 Tolerate 45 minute treatment session, no increase in pain.  -SS     STG 1 Progress Met  -SS     STG 2 Subjective improvement 60% or greater.  -SS     STG 2 Progress Met  -SS     STG 3 R index finger MP AROM to 70 deg or greater.  -SS     STG 3 Progress Met  -SS     STG 4 R index finger PIP AROM to 90 deg or greater.  -SS     STG 4 Progress Met  -SS     STG 5 R index finger DIP AROM to 50 deg or greater.  -SS     STG 5 Progress Met  -SS     STG 6 R  strength within 5-10 lbs of L  strength.  -SS     STG 6 Progress Not Met  -SS      STG 7 Quick DASH to 20% or less.  -     STG 7 Progress Not Met  -     Long Term Goals    LTG Date to Achieve --   deferred at this time  -     Time Calculation    PT Goal Re-Cert Due Date 08/09/17  -       User Key  (r) = Recorded By, (t) = Taken By, (c) = Cosigned By    Initials Name Provider Type     Gareth Penny, PT Physical Therapist                PT Assessment/Plan       07/19/17 1600       PT Assessment    Functional Limitations Performance in self-care ADL;Performance in sport activities;Performance in work activities;Limitation in home management;Performance in leisure activities  -     Impairments Dexterity;Edema;Joint mobility;Motor function;Muscle strength;Pain;Sensation  -     Assessment Comments Advanced putty to red for HEP. Patient was given a compression sleeve for support of R IF. Sore with work simulation activities.   -     Rehab Potential Good  -     Patient/caregiver participated in establishment of treatment plan and goals Yes  -SS     Patient would benefit from skilled therapy intervention Yes  -SS     PT Plan    PT Frequency 2x/week  -     Predicted Duration of Therapy Intervention (days/wks) 2-3 weeks  -     PT Plan Comments Hand strengthening and work simulation activities  -       User Key  (r) = Recorded By, (t) = Taken By, (c) = Cosigned By    Initials Name Provider Type     Gareth Penny, PT Physical Therapist                Modalities       07/19/17 1500          Subjective Pain    Post-Treatment Pain Level 0  -      Subjective Pain Comment R hand feels fatigued after treatment.  -      [place]    [place] [address] Location R hand  -      Rx Minutes 10  -        User Key  (r) = Recorded By, (t) = Taken By, (c) = Cosigned By    Initials Name Provider Type     Gareth Penny, PT Physical Therapist              Exercises       07/19/17 1500          Subjective Comments    Subjective Comments Saw Dr. Lancaster yesterday. Goes back to  him on 7/26/17. Continued restrictions due to concern about ability to use knife to remove glue from headliner. Movement is much better than when therapy started. Able to use hand normally at home. Catches herself babying the right index finger. Has been prescribed an levofloxacin for a kidney infection. 80% subjective improvement  -SS      Subjective Pain    Able to rate subjective pain? yes  -SS      Pre-Treatment Pain Level 0  -SS      Post-Treatment Pain Level 0  -SS      Subjective Pain Comment R hand feels fatigued after treatment.  -SS      Exercise 1    Exercise Name 1 work simulation of scraping glue from part   small knife and thermoplastic  -SS      Time (Minutes) 1 3 mins  -SS      Exercise 2    Exercise Name 2 Velcro block pickup   using velcro roller board  -SS      Time (Minutes) 2 1 min  -SS      Exercise 3    Exercise Name 3 Clothes pin lateral pinch  -SS      Cueing 3 Demo  -SS      Resistance 3 Green  -SS      Reps 3 5   5 pins, 5 reps  -SS      Exercise 4    Exercise Name 4 Digiflex  -SS      Cueing 4 Demo  -SS      Equipment 4 Hand Gripper  -SS      Resistance 4 Green  -SS      Sets 4 3  -SS      Reps 4 10  -SS        User Key  (r) = Recorded By, (t) = Taken By, (c) = Cosigned By    Initials Name Provider Type    SS Gareth Penny, PT Physical Therapist                             Time Calculation:   Start Time: 1557  Stop Time: 1641  Time Calculation (min): 44 min     Therapy Charges for Today     Code Description Service Date Service Provider Modifiers Qty    27463781795 HC PT PARAFFIN BATH 7/19/2017 Gareth Penny, PT GP 1    67479331130 HC PT THER PROC EA 15 MIN 7/19/2017 Gareth Penny, PT GP 2                    Gareth Penny, PT  7/19/2017

## 2017-07-25 ENCOUNTER — HOSPITAL ENCOUNTER (OUTPATIENT)
Dept: PHYSICAL THERAPY | Facility: HOSPITAL | Age: 67
Setting detail: THERAPIES SERIES
Discharge: HOME | End: 2017-07-25

## 2017-07-25 DIAGNOSIS — S69.91XA INJURY OF RIGHT INDEX FINGER, INITIAL ENCOUNTER: Primary | ICD-10-CM

## 2017-07-25 NOTE — THERAPY TREATMENT NOTE
Outpatient Physical Therapy Hand Treatment Note   [facility]     Patient Name: Bushra Tidwell  : 1950  MRN: [id number]  Today's Date: 2017         Visit Date: 2017  Subjective Improvement:  80%  Visit Number:      Recert Date:    17  MD Visit:    None  Total Approved Visits:   10 total approved    PT Diagnosis:    Crush injury R IF  Visit Dx:    ICD-10-CM ICD-9-CM   1. Injury of right index finger, initial encounter S69.91XA 959.5       There is no problem list on file for this patient.                           PT Assessment/Plan       17 1708       PT Assessment    Assessment Comments Unable to perform assessment this date due to patient needing to leave because she had upset stomach.    -BB     PT Plan    PT Frequency 1x/week;2x/week  -BB     Predicted Duration of Therapy Intervention (days/wks) x 3 move visits per MD  -BB     PT Plan Comments Continue 3 more visits then anticipate DC to independent program.  Resume strength and work simulated activities.   -BB       User Key  (r) = Recorded By, (t) = Taken By, (c) = Cosigned By    Initials Name Provider Type    VENTURA Quintanilla PTA Physical Therapy Assistant                    Exercises       17 1600          Subjective Comments    Subjective Comments States she is  if she pinches with that finger.  States she still lacks strength.   says to finish 4 more visits of PT and that she does not have to return to him. Half way through velcro blocks patient asked to be excused to go to restroom.  After 10 minutes pt retured and said she needed to leave because she was not feeling well.   -BB      Subjective Pain    Able to rate subjective pain? yes  -BB      Pre-Treatment Pain Level 0  -BB      Post-Treatment Pain Level 0  -BB      Aquatics    Aquatics performed? No  -BB      Exercise 1    Exercise Name 1 Velcro blocks on/off   Only able to complete 1/2 board.    -BB      Reps 1 1  -BB        User Key   (r) = Recorded By, (t) = Taken By, (c) = Cosigned By    Initials Name Provider Type    VENTURA Quintanilla PTA Physical Therapy Assistant                               PT OP Goals       07/25/17 1711       Time Calculation    PT Goal Re-Cert Due Date 08/09/17  -VENTURA       User Key  (r) = Recorded By, (t) = Taken By, (c) = Cosigned By    Initials Name Provider Type    VENTURA Quintanilla PTA Physical Therapy Assistant                      Time Calculation:   Start Time: 1645  Stop Time: 1700  Time Calculation (min): 15 min  PT Non-Billable Time (min): 15 min     Therapy Charges for Today     Code Description Service Date Service Provider Modifiers Qty    99559360586 HC PT THER SUPP EA 15 MIN 7/25/2017 Dalila Quintanilla PTA GP 1                   Dalila Quintanilla PTA  7/25/2017

## 2017-07-27 ENCOUNTER — APPOINTMENT (OUTPATIENT)
Dept: PHYSICAL THERAPY | Facility: HOSPITAL | Age: 67
End: 2017-07-27

## 2017-08-02 ENCOUNTER — APPOINTMENT (OUTPATIENT)
Dept: PHYSICAL THERAPY | Facility: HOSPITAL | Age: 67
End: 2017-08-02

## 2017-08-03 ENCOUNTER — APPOINTMENT (OUTPATIENT)
Dept: PHYSICAL THERAPY | Facility: HOSPITAL | Age: 67
End: 2017-08-03

## 2017-08-08 ENCOUNTER — HOSPITAL ENCOUNTER (OUTPATIENT)
Dept: PHYSICAL THERAPY | Facility: HOSPITAL | Age: 67
Setting detail: THERAPIES SERIES
Discharge: HOME | End: 2017-08-08

## 2017-08-08 DIAGNOSIS — S69.91XA INJURY OF RIGHT INDEX FINGER, INITIAL ENCOUNTER: Primary | ICD-10-CM

## 2017-08-08 PROCEDURE — 97110 THERAPEUTIC EXERCISES: CPT

## 2017-08-08 NOTE — THERAPY DISCHARGE NOTE
Outpatient Physical Therapy Hand Treatment Note/Discharge Summary  [facility]     Patient Name: Bushra Tidwell  : 1950  MRN: [id number]  Today's Date: 2017         Visit Date: 2017  Subjective Improvement: 90%  Visit Number:     Recert Date:   N/a DC'd this date  MD Visit:   None  Total Approved Visits:  10    PT Diagnosis:  Crush injury R IF  Visit Dx:    ICD-10-CM ICD-9-CM   1. Injury of right index finger, initial encounter S69.91XA 959.5       There is no problem list on file for this patient.                           PT Assessment/Plan       17 1636       PT Assessment    Assessment Comments Good tolerance. Still having issues with sensitivity. Pt edu on desensitizaion at home.   -BB     PT Plan    Predicted Duration of Therapy Intervention (days/wks) DC to independent HEP  -BB     PT Plan Comments DC to independent HEP  -BB       User Key  (r) = Recorded By, (t) = Taken By, (c) = Cosigned By    Initials Name Provider Type    VENTURA Quintanilla PTA Physical Therapy Assistant                    Exercises       17 1600          Subjective Comments    Subjective Comments States the only problem she has is with sensitivity.  States her first day back to work she burned finger.  States she is back to working 12 hr shifts.   -BB      Subjective Pain    Able to rate subjective pain? yes  -BB      Pre-Treatment Pain Level 0  -BB      Post-Treatment Pain Level 0  -BB      Aquatics    Aquatics performed? No  -BB      Exercise 1    Exercise Name 1 Digiflex Green  -BB      Reps 1 20  -BB      Exercise 2    Exercise Name 2 Fluido with AROM and desensitization  -BB      Time (Minutes) 2 15  -BB      Exercise 3    Exercise Name 3 green clothes pin pinch  -BB      Reps 3 20  -BB      Exercise 4    Exercise Name 4 velcro blocks  -BB      Reps 4 1  -BB      Exercise 5    Exercise Name 5 velcro key vertical  -BB      Reps 5 5  -BB      Exercise 6    Exercise Name 6 velcro small  roller vertical  -BB      Reps 6 5  -BB        User Key  (r) = Recorded By, (t) = Taken By, (c) = Cosigned By    Initials Name Provider Type    VENTURA Quintanilla PTA Physical Therapy Assistant                               PT OP Goals       08/08/17 1600       PT Short Term Goals    STG Date to Achieve 07/19/17  -BB     STG 1 Tolerate 45 minute treatment session, no increase in pain.  -BB     STG 1 Progress Met  -BB     STG 2 Subjective improvement 60% or greater.  -BB     STG 2 Progress Met  -BB     STG 3 R index finger MP AROM to 70 deg or greater.  -BB     STG 3 Progress Met  -BB     STG 4 R index finger PIP AROM to 90 deg or greater.  -BB     STG 4 Progress Met  -BB     STG 5 R index finger DIP AROM to 50 deg or greater.  -BB     STG 5 Progress Met  -BB     STG 6 R  strength within 5-10 lbs of L  strength.  -BB     STG 6 Progress Met   R  35Lbs, L  40 lbs  -BB     STG 7 Quick DASH to 20% or less.  -BB     STG 7 Progress Met  -BB     Long Term Goals    LTG Date to Achieve --   deferred at this time  -BB     Time Calculation    PT Goal Re-Cert Due Date 08/09/17  -BB       User Key  (r) = Recorded By, (t) = Taken By, (c) = Cosigned By    Initials Name Provider Type    VENTURA Quintanilla PTA Physical Therapy Assistant                      Time Calculation:   Start Time: 1600  Stop Time: 1641  Time Calculation (min): 41 min  Total Timed Code Minutes- PT: 41 minute(s)     Therapy Charges for Today     Code Description Service Date Service Provider Modifiers Qty    [id number] HC PT THER PROC EA 15 MIN 8/8/2017 Dalila Quintanilla PTA GP 3               OP PT Discharge Summary  Reason for Discharge: Independent, All goals achieved  Outcomes Achieved: Able to achieve all goals within established timeline  Discharge Destination: Home with home program  Discharge Instructions: Pt to continue HEP and desensitizion.       Dalila Quintanilla PTA  8/8/2017

## 2017-08-10 ENCOUNTER — APPOINTMENT (OUTPATIENT)
Dept: PHYSICAL THERAPY | Facility: HOSPITAL | Age: 67
End: 2017-08-10

## 2017-08-14 ENCOUNTER — APPOINTMENT (OUTPATIENT)
Dept: PHYSICAL THERAPY | Facility: HOSPITAL | Age: 67
End: 2017-08-14

## 2021-03-04 ENCOUNTER — IMMUNIZATION (OUTPATIENT)
Dept: VACCINE CLINIC | Facility: HOSPITAL | Age: 71
End: 2021-03-04

## 2021-03-04 PROCEDURE — 0001A: CPT | Performed by: THORACIC SURGERY (CARDIOTHORACIC VASCULAR SURGERY)

## 2021-03-04 PROCEDURE — 91300 HC SARSCOV02 VAC 30MCG/0.3ML IM: CPT | Performed by: THORACIC SURGERY (CARDIOTHORACIC VASCULAR SURGERY)

## 2021-03-25 ENCOUNTER — APPOINTMENT (OUTPATIENT)
Dept: VACCINE CLINIC | Facility: HOSPITAL | Age: 71
End: 2021-03-25

## 2021-03-26 ENCOUNTER — IMMUNIZATION (OUTPATIENT)
Dept: VACCINE CLINIC | Facility: HOSPITAL | Age: 71
End: 2021-03-26

## 2021-03-26 PROCEDURE — 91300 HC SARSCOV02 VAC 30MCG/0.3ML IM: CPT | Performed by: NURSE PRACTITIONER

## 2021-03-26 PROCEDURE — 0002A: CPT | Performed by: NURSE PRACTITIONER

## 2021-12-29 ENCOUNTER — APPOINTMENT (OUTPATIENT)
Dept: CT IMAGING | Facility: HOSPITAL | Age: 71
End: 2021-12-29

## 2021-12-29 ENCOUNTER — HOSPITAL ENCOUNTER (EMERGENCY)
Facility: HOSPITAL | Age: 71
Discharge: HOME | End: 2021-12-29
Attending: EMERGENCY MEDICINE | Admitting: EMERGENCY MEDICINE

## 2021-12-29 VITALS
HEART RATE: 74 BPM | WEIGHT: 210 LBS | OXYGEN SATURATION: 97 % | DIASTOLIC BLOOD PRESSURE: 79 MMHG | HEIGHT: 66 IN | SYSTOLIC BLOOD PRESSURE: 170 MMHG | RESPIRATION RATE: 18 BRPM | TEMPERATURE: 98.5 F | BODY MASS INDEX: 33.75 KG/M2

## 2021-12-29 DIAGNOSIS — E27.8 LEFT ADRENAL MASS: ICD-10-CM

## 2021-12-29 DIAGNOSIS — M54.6 ACUTE BILATERAL THORACIC BACK PAIN: Primary | ICD-10-CM

## 2021-12-29 LAB
ALBUMIN SERPL-MCNC: 3.9 G/DL (ref 3.5–5.2)
ALBUMIN/GLOB SERPL: 1.2 G/DL
ALP SERPL-CCNC: 38 U/L (ref 39–117)
ALT SERPL W P-5'-P-CCNC: 23 U/L (ref 1–33)
ANION GAP SERPL CALCULATED.3IONS-SCNC: 12 MMOL/L (ref 5–15)
AST SERPL-CCNC: 38 U/L (ref 1–32)
BACTERIA UR QL AUTO: ABNORMAL /HPF
BASOPHILS # BLD AUTO: 0.12 10*3/MM3 (ref 0–0.2)
BASOPHILS NFR BLD AUTO: 1.1 % (ref 0–1.5)
BILIRUB SERPL-MCNC: 0.7 MG/DL (ref 0–1.2)
BILIRUB UR QL STRIP: NEGATIVE
BUN SERPL-MCNC: 14 MG/DL (ref 8–23)
BUN/CREAT SERPL: 14.3 (ref 7–25)
CALCIUM SPEC-SCNC: 9.4 MG/DL (ref 8.6–10.5)
CHLORIDE SERPL-SCNC: 102 MMOL/L (ref 98–107)
CLARITY UR: ABNORMAL
CO2 SERPL-SCNC: 26 MMOL/L (ref 22–29)
COLOR UR: YELLOW
CREAT SERPL-MCNC: 0.98 MG/DL (ref 0.57–1)
DEPRECATED RDW RBC AUTO: 44.4 FL (ref 37–54)
EOSINOPHIL # BLD AUTO: 0.61 10*3/MM3 (ref 0–0.4)
EOSINOPHIL NFR BLD AUTO: 5.4 % (ref 0.3–6.2)
ERYTHROCYTE [DISTWIDTH] IN BLOOD BY AUTOMATED COUNT: 13.6 % (ref 12.3–15.4)
GFR SERPL CREATININE-BSD FRML MDRD: 56 ML/MIN/1.73
GLOBULIN UR ELPH-MCNC: 3.2 GM/DL
GLUCOSE SERPL-MCNC: 118 MG/DL (ref 65–99)
GLUCOSE UR STRIP-MCNC: NEGATIVE MG/DL
HCT VFR BLD AUTO: 34.9 % (ref 34–46.6)
HGB BLD-MCNC: 11.8 G/DL (ref 12–15.9)
HGB UR QL STRIP.AUTO: ABNORMAL
HOLD SPECIMEN: NORMAL
HOLD SPECIMEN: NORMAL
HYALINE CASTS UR QL AUTO: ABNORMAL /LPF
IMM GRANULOCYTES # BLD AUTO: 0.05 10*3/MM3 (ref 0–0.05)
IMM GRANULOCYTES NFR BLD AUTO: 0.4 % (ref 0–0.5)
KETONES UR QL STRIP: NEGATIVE
LEUKOCYTE ESTERASE UR QL STRIP.AUTO: ABNORMAL
LIPASE SERPL-CCNC: 20 U/L (ref 13–60)
LYMPHOCYTES # BLD AUTO: 1.4 10*3/MM3 (ref 0.7–3.1)
LYMPHOCYTES NFR BLD AUTO: 12.5 % (ref 19.6–45.3)
MCH RBC QN AUTO: 30.3 PG (ref 26.6–33)
MCHC RBC AUTO-ENTMCNC: 33.8 G/DL (ref 31.5–35.7)
MCV RBC AUTO: 89.7 FL (ref 79–97)
MONOCYTES # BLD AUTO: 1.07 10*3/MM3 (ref 0.1–0.9)
MONOCYTES NFR BLD AUTO: 9.5 % (ref 5–12)
NEUTROPHILS NFR BLD AUTO: 7.96 10*3/MM3 (ref 1.7–7)
NEUTROPHILS NFR BLD AUTO: 71.1 % (ref 42.7–76)
NITRITE UR QL STRIP: NEGATIVE
NRBC BLD AUTO-RTO: 0 /100 WBC (ref 0–0.2)
PH UR STRIP.AUTO: 8.5 [PH] (ref 5–9)
PLATELET # BLD AUTO: 330 10*3/MM3 (ref 140–450)
PMV BLD AUTO: 9.7 FL (ref 6–12)
POTASSIUM SERPL-SCNC: 3.7 MMOL/L (ref 3.5–5.2)
PROT SERPL-MCNC: 7.1 G/DL (ref 6–8.5)
PROT UR QL STRIP: ABNORMAL
QT INTERVAL: 372 MS
QTC INTERVAL: 415 MS
RBC # BLD AUTO: 3.89 10*6/MM3 (ref 3.77–5.28)
RBC # UR STRIP: ABNORMAL /HPF
REF LAB TEST METHOD: ABNORMAL
SODIUM SERPL-SCNC: 140 MMOL/L (ref 136–145)
SP GR UR STRIP: 1.01 (ref 1–1.03)
SQUAMOUS #/AREA URNS HPF: ABNORMAL /HPF
TRANS CELLS #/AREA URNS HPF: ABNORMAL /HPF
TROPONIN T SERPL-MCNC: <0.01 NG/ML (ref 0–0.03)
UROBILINOGEN UR QL STRIP: ABNORMAL
WBC # UR STRIP: ABNORMAL /HPF
WBC NRBC COR # BLD: 11.21 10*3/MM3 (ref 3.4–10.8)
WHOLE BLOOD HOLD SPECIMEN: NORMAL
WHOLE BLOOD HOLD SPECIMEN: NORMAL

## 2021-12-29 PROCEDURE — 80053 COMPREHEN METABOLIC PANEL: CPT | Performed by: EMERGENCY MEDICINE

## 2021-12-29 PROCEDURE — 25010000002 HYDROMORPHONE 1 MG/ML SOLUTION: Performed by: EMERGENCY MEDICINE

## 2021-12-29 PROCEDURE — 74177 CT ABD & PELVIS W/CONTRAST: CPT

## 2021-12-29 PROCEDURE — 83690 ASSAY OF LIPASE: CPT | Performed by: EMERGENCY MEDICINE

## 2021-12-29 PROCEDURE — 93005 ELECTROCARDIOGRAM TRACING: CPT | Performed by: EMERGENCY MEDICINE

## 2021-12-29 PROCEDURE — 99284 EMERGENCY DEPT VISIT MOD MDM: CPT

## 2021-12-29 PROCEDURE — 96374 THER/PROPH/DIAG INJ IV PUSH: CPT

## 2021-12-29 PROCEDURE — 81001 URINALYSIS AUTO W/SCOPE: CPT | Performed by: EMERGENCY MEDICINE

## 2021-12-29 PROCEDURE — 71275 CT ANGIOGRAPHY CHEST: CPT

## 2021-12-29 PROCEDURE — 96376 TX/PRO/DX INJ SAME DRUG ADON: CPT

## 2021-12-29 PROCEDURE — 85025 COMPLETE CBC W/AUTO DIFF WBC: CPT | Performed by: EMERGENCY MEDICINE

## 2021-12-29 PROCEDURE — 25010000002 ONDANSETRON PER 1 MG: Performed by: EMERGENCY MEDICINE

## 2021-12-29 PROCEDURE — 93010 ELECTROCARDIOGRAM REPORT: CPT | Performed by: INTERNAL MEDICINE

## 2021-12-29 PROCEDURE — 0 IOPAMIDOL PER 1 ML: Performed by: EMERGENCY MEDICINE

## 2021-12-29 PROCEDURE — 84484 ASSAY OF TROPONIN QUANT: CPT | Performed by: EMERGENCY MEDICINE

## 2021-12-29 PROCEDURE — 96361 HYDRATE IV INFUSION ADD-ON: CPT

## 2021-12-29 PROCEDURE — 96375 TX/PRO/DX INJ NEW DRUG ADDON: CPT

## 2021-12-29 RX ORDER — ALBUTEROL SULFATE 90 UG/1
2 AEROSOL, METERED RESPIRATORY (INHALATION)
COMMUNITY

## 2021-12-29 RX ORDER — PREGABALIN 75 MG/1
CAPSULE ORAL
COMMUNITY

## 2021-12-29 RX ORDER — LIDOCAINE HYDROCHLORIDE 20 MG/ML
15 SOLUTION OROPHARYNGEAL ONCE
Status: COMPLETED | OUTPATIENT
Start: 2021-12-29 | End: 2021-12-29

## 2021-12-29 RX ORDER — TRAMADOL HYDROCHLORIDE 50 MG/1
50 TABLET ORAL
COMMUNITY
Start: 2021-12-16

## 2021-12-29 RX ORDER — ONDANSETRON 4 MG/1
4 TABLET, ORALLY DISINTEGRATING ORAL EVERY 6 HOURS PRN
Qty: 15 TABLET | Refills: 0 | Status: SHIPPED | OUTPATIENT
Start: 2021-12-29

## 2021-12-29 RX ORDER — ACETAMINOPHEN 325 MG/1
650 TABLET ORAL
COMMUNITY
Start: 2021-12-16

## 2021-12-29 RX ORDER — LABETALOL HYDROCHLORIDE 5 MG/ML
40 INJECTION, SOLUTION INTRAVENOUS ONCE
Status: COMPLETED | OUTPATIENT
Start: 2021-12-29 | End: 2021-12-29

## 2021-12-29 RX ORDER — OXYCODONE HYDROCHLORIDE 5 MG/1
TABLET ORAL EVERY 6 HOURS
COMMUNITY

## 2021-12-29 RX ORDER — SODIUM CHLORIDE 0.9 % (FLUSH) 0.9 %
10 SYRINGE (ML) INJECTION AS NEEDED
Status: DISCONTINUED | OUTPATIENT
Start: 2021-12-29 | End: 2021-12-29 | Stop reason: HOSPADM

## 2021-12-29 RX ORDER — PHENOL 1.4 %
1 AEROSOL, SPRAY (ML) MUCOUS MEMBRANE
COMMUNITY

## 2021-12-29 RX ORDER — MONTELUKAST SODIUM 10 MG/1
10 TABLET ORAL
COMMUNITY
Start: 2021-11-30

## 2021-12-29 RX ORDER — SODIUM CHLORIDE 9 MG/ML
125 INJECTION, SOLUTION INTRAVENOUS CONTINUOUS
Status: DISCONTINUED | OUTPATIENT
Start: 2021-12-29 | End: 2021-12-29 | Stop reason: HOSPADM

## 2021-12-29 RX ORDER — ONDANSETRON 2 MG/ML
4 INJECTION INTRAMUSCULAR; INTRAVENOUS ONCE
Status: COMPLETED | OUTPATIENT
Start: 2021-12-29 | End: 2021-12-29

## 2021-12-29 RX ORDER — FLUOXETINE HYDROCHLORIDE 20 MG/1
CAPSULE ORAL
COMMUNITY

## 2021-12-29 RX ORDER — ROSUVASTATIN CALCIUM 10 MG/1
10 TABLET, COATED ORAL
COMMUNITY
Start: 2021-11-08

## 2021-12-29 RX ORDER — LEVOTHYROXINE SODIUM 0.05 MG/1
TABLET ORAL
COMMUNITY
Start: 2021-11-29

## 2021-12-29 RX ORDER — PSEUDOEPHEDRINE HCL 30 MG
250 TABLET ORAL
COMMUNITY
Start: 2021-11-29

## 2021-12-29 RX ORDER — AMLODIPINE BESYLATE 2.5 MG/1
2.5 TABLET ORAL
COMMUNITY
Start: 2021-11-30

## 2021-12-29 RX ORDER — HYDROCODONE BITARTRATE AND ACETAMINOPHEN 5; 325 MG/1; MG/1
1 TABLET ORAL EVERY 6 HOURS PRN
Qty: 15 TABLET | Refills: 0 | Status: SHIPPED | OUTPATIENT
Start: 2021-12-29

## 2021-12-29 RX ORDER — ONDANSETRON 4 MG/1
4 TABLET, ORALLY DISINTEGRATING ORAL
COMMUNITY
Start: 2021-12-16 | End: 2021-12-29

## 2021-12-29 RX ORDER — ALUMINA, MAGNESIA, AND SIMETHICONE 2400; 2400; 240 MG/30ML; MG/30ML; MG/30ML
15 SUSPENSION ORAL ONCE
Status: COMPLETED | OUTPATIENT
Start: 2021-12-29 | End: 2021-12-29

## 2021-12-29 RX ORDER — PROMETHAZINE HYDROCHLORIDE 25 MG/1
25 TABLET ORAL EVERY 6 HOURS PRN
Qty: 30 TABLET | Refills: 0 | Status: SHIPPED | OUTPATIENT
Start: 2021-12-29

## 2021-12-29 RX ADMIN — LIDOCAINE HYDROCHLORIDE 15 ML: 20 SOLUTION ORAL; TOPICAL at 10:37

## 2021-12-29 RX ADMIN — SODIUM CHLORIDE 125 ML/HR: 9 INJECTION, SOLUTION INTRAVENOUS at 09:58

## 2021-12-29 RX ADMIN — HYDROMORPHONE HYDROCHLORIDE 1 MG: 1 INJECTION, SOLUTION INTRAMUSCULAR; INTRAVENOUS; SUBCUTANEOUS at 08:19

## 2021-12-29 RX ADMIN — ALUMINUM HYDROXIDE, MAGNESIUM HYDROXIDE, AND DIMETHICONE 15 ML: 400; 400; 40 SUSPENSION ORAL at 10:36

## 2021-12-29 RX ADMIN — HYDROMORPHONE HYDROCHLORIDE 1 MG: 1 INJECTION, SOLUTION INTRAMUSCULAR; INTRAVENOUS; SUBCUTANEOUS at 12:30

## 2021-12-29 RX ADMIN — ONDANSETRON 4 MG: 2 INJECTION INTRAMUSCULAR; INTRAVENOUS at 08:19

## 2021-12-29 RX ADMIN — IOPAMIDOL 90 ML: 755 INJECTION, SOLUTION INTRAVENOUS at 09:26

## 2021-12-29 RX ADMIN — HYDROMORPHONE HYDROCHLORIDE 1 MG: 1 INJECTION, SOLUTION INTRAMUSCULAR; INTRAVENOUS; SUBCUTANEOUS at 09:58

## 2021-12-29 RX ADMIN — LABETALOL HYDROCHLORIDE 40 MG: 5 INJECTION, SOLUTION INTRAVENOUS at 13:12

## 2021-12-29 RX ADMIN — LABETALOL HYDROCHLORIDE 40 MG: 5 INJECTION, SOLUTION INTRAVENOUS at 11:08

## 2021-12-29 RX ADMIN — SODIUM CHLORIDE 500 ML: 9 INJECTION, SOLUTION INTRAVENOUS at 08:20

## 2023-01-15 ENCOUNTER — HOSPITAL ENCOUNTER (EMERGENCY)
Facility: HOSPITAL | Age: 73
Discharge: HOME | End: 2023-01-15
Attending: STUDENT IN AN ORGANIZED HEALTH CARE EDUCATION/TRAINING PROGRAM | Admitting: STUDENT IN AN ORGANIZED HEALTH CARE EDUCATION/TRAINING PROGRAM
Payer: MEDICARE

## 2023-01-15 ENCOUNTER — APPOINTMENT (OUTPATIENT)
Dept: GENERAL RADIOLOGY | Facility: HOSPITAL | Age: 73
End: 2023-01-15
Payer: MEDICARE

## 2023-01-15 VITALS
OXYGEN SATURATION: 94 % | HEART RATE: 71 BPM | TEMPERATURE: 98.1 F | SYSTOLIC BLOOD PRESSURE: 163 MMHG | RESPIRATION RATE: 18 BRPM | BODY MASS INDEX: 31.34 KG/M2 | WEIGHT: 195 LBS | HEIGHT: 66 IN | DIASTOLIC BLOOD PRESSURE: 80 MMHG

## 2023-01-15 DIAGNOSIS — R42 LIGHTHEADED: Primary | ICD-10-CM

## 2023-01-15 LAB
ALBUMIN SERPL-MCNC: 3.8 G/DL (ref 3.5–5.2)
ALBUMIN/GLOB SERPL: 1.4 G/DL
ALP SERPL-CCNC: 25 U/L (ref 39–117)
ALT SERPL W P-5'-P-CCNC: 15 U/L (ref 1–33)
ANION GAP SERPL CALCULATED.3IONS-SCNC: 10 MMOL/L (ref 5–15)
AST SERPL-CCNC: 33 U/L (ref 1–32)
BACTERIA UR QL AUTO: ABNORMAL /HPF
BASOPHILS # BLD AUTO: 0.08 10*3/MM3 (ref 0–0.2)
BASOPHILS NFR BLD AUTO: 0.9 % (ref 0–1.5)
BILIRUB SERPL-MCNC: 0.7 MG/DL (ref 0–1.2)
BILIRUB UR QL STRIP: NEGATIVE
BUN SERPL-MCNC: 15 MG/DL (ref 8–23)
BUN/CREAT SERPL: 10.3 (ref 7–25)
CALCIUM SPEC-SCNC: 9.3 MG/DL (ref 8.6–10.5)
CHLORIDE SERPL-SCNC: 104 MMOL/L (ref 98–107)
CLARITY UR: ABNORMAL
CO2 SERPL-SCNC: 28 MMOL/L (ref 22–29)
COLOR UR: YELLOW
CREAT SERPL-MCNC: 1.46 MG/DL (ref 0.57–1)
DEPRECATED RDW RBC AUTO: 44.3 FL (ref 37–54)
EGFRCR SERPLBLD CKD-EPI 2021: 38.6 ML/MIN/1.73
EOSINOPHIL # BLD AUTO: 0.16 10*3/MM3 (ref 0–0.4)
EOSINOPHIL NFR BLD AUTO: 1.8 % (ref 0.3–6.2)
ERYTHROCYTE [DISTWIDTH] IN BLOOD BY AUTOMATED COUNT: 13.4 % (ref 12.3–15.4)
GLOBULIN UR ELPH-MCNC: 2.8 GM/DL
GLUCOSE SERPL-MCNC: 104 MG/DL (ref 65–99)
GLUCOSE UR STRIP-MCNC: NEGATIVE MG/DL
HCT VFR BLD AUTO: 37.9 % (ref 34–46.6)
HGB BLD-MCNC: 12.9 G/DL (ref 12–15.9)
HGB UR QL STRIP.AUTO: ABNORMAL
HOLD SPECIMEN: NORMAL
HYALINE CASTS UR QL AUTO: ABNORMAL /LPF
IMM GRANULOCYTES # BLD AUTO: 0.02 10*3/MM3 (ref 0–0.05)
IMM GRANULOCYTES NFR BLD AUTO: 0.2 % (ref 0–0.5)
KETONES UR QL STRIP: NEGATIVE
LEUKOCYTE ESTERASE UR QL STRIP.AUTO: ABNORMAL
LYMPHOCYTES # BLD AUTO: 0.96 10*3/MM3 (ref 0.7–3.1)
LYMPHOCYTES NFR BLD AUTO: 10.8 % (ref 19.6–45.3)
MAGNESIUM SERPL-MCNC: 2.1 MG/DL (ref 1.6–2.4)
MCH RBC QN AUTO: 30.6 PG (ref 26.6–33)
MCHC RBC AUTO-ENTMCNC: 34 G/DL (ref 31.5–35.7)
MCV RBC AUTO: 90 FL (ref 79–97)
MONOCYTES # BLD AUTO: 1.09 10*3/MM3 (ref 0.1–0.9)
MONOCYTES NFR BLD AUTO: 12.2 % (ref 5–12)
NEUTROPHILS NFR BLD AUTO: 6.6 10*3/MM3 (ref 1.7–7)
NEUTROPHILS NFR BLD AUTO: 74.1 % (ref 42.7–76)
NITRITE UR QL STRIP: NEGATIVE
NRBC BLD AUTO-RTO: 0 /100 WBC (ref 0–0.2)
NT-PROBNP SERPL-MCNC: 291.6 PG/ML (ref 0–900)
PH UR STRIP.AUTO: 7 [PH] (ref 5–9)
PLATELET # BLD AUTO: 205 10*3/MM3 (ref 140–450)
PMV BLD AUTO: 11.1 FL (ref 6–12)
POTASSIUM SERPL-SCNC: 3.5 MMOL/L (ref 3.5–5.2)
PROT SERPL-MCNC: 6.6 G/DL (ref 6–8.5)
PROT UR QL STRIP: NEGATIVE
RBC # BLD AUTO: 4.21 10*6/MM3 (ref 3.77–5.28)
RBC # UR STRIP: ABNORMAL /HPF
REF LAB TEST METHOD: ABNORMAL
SODIUM SERPL-SCNC: 142 MMOL/L (ref 136–145)
SP GR UR STRIP: 1.01 (ref 1–1.03)
SQUAMOUS #/AREA URNS HPF: ABNORMAL /HPF
TROPONIN T SERPL-MCNC: <0.01 NG/ML (ref 0–0.03)
UROBILINOGEN UR QL STRIP: ABNORMAL
WBC # UR STRIP: ABNORMAL /HPF
WBC NRBC COR # BLD: 8.91 10*3/MM3 (ref 3.4–10.8)
WHOLE BLOOD HOLD COAG: NORMAL

## 2023-01-15 PROCEDURE — 83735 ASSAY OF MAGNESIUM: CPT | Performed by: STUDENT IN AN ORGANIZED HEALTH CARE EDUCATION/TRAINING PROGRAM

## 2023-01-15 PROCEDURE — 83880 ASSAY OF NATRIURETIC PEPTIDE: CPT | Performed by: STUDENT IN AN ORGANIZED HEALTH CARE EDUCATION/TRAINING PROGRAM

## 2023-01-15 PROCEDURE — 81001 URINALYSIS AUTO W/SCOPE: CPT | Performed by: STUDENT IN AN ORGANIZED HEALTH CARE EDUCATION/TRAINING PROGRAM

## 2023-01-15 PROCEDURE — 99283 EMERGENCY DEPT VISIT LOW MDM: CPT

## 2023-01-15 PROCEDURE — 85025 COMPLETE CBC W/AUTO DIFF WBC: CPT | Performed by: STUDENT IN AN ORGANIZED HEALTH CARE EDUCATION/TRAINING PROGRAM

## 2023-01-15 PROCEDURE — 84484 ASSAY OF TROPONIN QUANT: CPT | Performed by: STUDENT IN AN ORGANIZED HEALTH CARE EDUCATION/TRAINING PROGRAM

## 2023-01-15 PROCEDURE — 71045 X-RAY EXAM CHEST 1 VIEW: CPT

## 2023-01-15 PROCEDURE — 93005 ELECTROCARDIOGRAM TRACING: CPT

## 2023-01-15 PROCEDURE — 93005 ELECTROCARDIOGRAM TRACING: CPT | Performed by: STUDENT IN AN ORGANIZED HEALTH CARE EDUCATION/TRAINING PROGRAM

## 2023-01-15 PROCEDURE — 80053 COMPREHEN METABOLIC PANEL: CPT | Performed by: STUDENT IN AN ORGANIZED HEALTH CARE EDUCATION/TRAINING PROGRAM

## 2023-01-15 PROCEDURE — 93010 ELECTROCARDIOGRAM REPORT: CPT | Performed by: INTERNAL MEDICINE

## 2023-01-15 RX ADMIN — SODIUM CHLORIDE, POTASSIUM CHLORIDE, SODIUM LACTATE AND CALCIUM CHLORIDE 1000 ML: 600; 310; 30; 20 INJECTION, SOLUTION INTRAVENOUS at 17:54

## 2023-01-15 NOTE — ED PROVIDER NOTES
Subjective   History of Present Illness  72-year-old female comes to the ER chief complaint of feeling lightheaded, clammy, got tunnel vision, felt warm all over while she was at work earlier today.  If elixirs got a pass out, patient sat down and she started feeling a little bit better.  She denies passing out..  Denies other symptoms.    History provided by:  Patient   used: No        Review of Systems   Constitutional: Positive for activity change and fatigue. Negative for chills and fever.   HENT: Negative for drooling.    Eyes: Negative for redness.   Respiratory: Negative for cough, chest tightness, shortness of breath and wheezing.    Cardiovascular: Negative for chest pain and palpitations.   Gastrointestinal: Negative for abdominal pain, nausea and vomiting.   Genitourinary: Negative for flank pain.   Skin: Negative for color change.   Neurological: Positive for light-headedness. Negative for dizziness, seizures, syncope, weakness, numbness and headaches.   Psychiatric/Behavioral: Negative for confusion.       Past Medical History:   Diagnosis Date   • Arthritis     B knees   • Cancer (HCC) 2015    skin cancer       Allergies   Allergen Reactions   • Molds & Smuts Shortness Of Breath   • Cefuroxime Axetil Rash       Past Surgical History:   Procedure Laterality Date   • APPENDECTOMY     • BILATERAL BREAST REDUCTION Bilateral    • BREAST SURGERY Bilateral     breast reduction   • HERNIA REPAIR      6   • HYSTERECTOMY     • SKIN BIOPSY         History reviewed. No pertinent family history.    Social History     Socioeconomic History   • Marital status:    Tobacco Use   • Smoking status: Never   Substance and Sexual Activity   • Alcohol use: Yes     Comment: occasional   • Drug use: Defer   • Sexual activity: Defer           Objective    Vitals:    01/15/23 1702 01/15/23 1919 01/15/23 1920 01/15/23 1921   BP:  137/71 159/79 163/80   BP Location:       Patient Position:  Lying Sitting  "Standing   Pulse:  62 64 71   Resp:    18   Temp:       TempSrc:       SpO2:    94%   Weight: 88.5 kg (195 lb)      Height: 167.6 cm (66\")          Physical Exam  Vitals and nursing note reviewed.   Constitutional:       General: She is not in acute distress.     Appearance: She is well-developed. She is ill-appearing. She is not toxic-appearing or diaphoretic.   Eyes:      Conjunctiva/sclera: Conjunctivae normal.   Cardiovascular:      Rate and Rhythm: Normal rate.   Pulmonary:      Effort: Pulmonary effort is normal. No accessory muscle usage or respiratory distress.   Chest:      Chest wall: No tenderness.   Abdominal:      Palpations: Abdomen is soft.      Tenderness: There is no abdominal tenderness (deep palpation).   Skin:     General: Skin is warm and dry.      Capillary Refill: Capillary refill takes less than 2 seconds.   Neurological:      Mental Status: She is alert and oriented to person, place, and time.         ECG 12 Lead      Date/Time: 1/15/2023 7:28 PM  Performed by: Nav Ritchie MD  Authorized by: Nav Ritchie MD   Interpreted by physician  Rhythm: sinus rhythm  Rate: normal  BPM: 60  QRS axis: normal  ST Segments: ST segments normal  T Waves: T waves normal  Clinical impression: normal ECG                  ED Course      Results for orders placed or performed during the hospital encounter of 01/15/23   Comprehensive Metabolic Panel    Specimen: Blood   Result Value Ref Range    Glucose 104 (H) 65 - 99 mg/dL    BUN 15 8 - 23 mg/dL    Creatinine 1.46 (H) 0.57 - 1.00 mg/dL    Sodium 142 136 - 145 mmol/L    Potassium 3.5 3.5 - 5.2 mmol/L    Chloride 104 98 - 107 mmol/L    CO2 28.0 22.0 - 29.0 mmol/L    Calcium 9.3 8.6 - 10.5 mg/dL    Total Protein 6.6 6.0 - 8.5 g/dL    Albumin 3.8 3.5 - 5.2 g/dL    ALT (SGPT) 15 1 - 33 U/L    AST (SGOT) 33 (H) 1 - 32 U/L    Alkaline Phosphatase 25 (L) 39 - 117 U/L    Total Bilirubin 0.7 0.0 - 1.2 mg/dL    Globulin 2.8 gm/dL    A/G Ratio 1.4 g/dL    " BUN/Creatinine Ratio 10.3 7.0 - 25.0    Anion Gap 10.0 5.0 - 15.0 mmol/L    eGFR 38.6 (L) >60.0 mL/min/1.73   Urinalysis With Microscopic If Indicated (No Culture) - Urine, Clean Catch    Specimen: Urine, Clean Catch   Result Value Ref Range    Color, UA Yellow Yellow, Straw, Dark Yellow, Diana    Appearance, UA Cloudy (A) Clear    pH, UA 7.0 5.0 - 9.0    Specific Gravity, UA 1.007 1.003 - 1.030    Glucose, UA Negative Negative    Ketones, UA Negative Negative    Bilirubin, UA Negative Negative    Blood, UA Moderate (2+) (A) Negative    Protein, UA Negative Negative    Leuk Esterase, UA Small (1+) (A) Negative    Nitrite, UA Negative Negative    Urobilinogen, UA 1.0 E.U./dL 0.2 - 1.0 E.U./dL   BNP    Specimen: Blood   Result Value Ref Range    proBNP 291.6 0.0 - 900.0 pg/mL   Troponin    Specimen: Blood   Result Value Ref Range    Troponin T <0.010 0.000 - 0.030 ng/mL   Magnesium    Specimen: Blood   Result Value Ref Range    Magnesium 2.1 1.6 - 2.4 mg/dL   CBC Auto Differential    Specimen: Blood   Result Value Ref Range    WBC 8.91 3.40 - 10.80 10*3/mm3    RBC 4.21 3.77 - 5.28 10*6/mm3    Hemoglobin 12.9 12.0 - 15.9 g/dL    Hematocrit 37.9 34.0 - 46.6 %    MCV 90.0 79.0 - 97.0 fL    MCH 30.6 26.6 - 33.0 pg    MCHC 34.0 31.5 - 35.7 g/dL    RDW 13.4 12.3 - 15.4 %    RDW-SD 44.3 37.0 - 54.0 fl    MPV 11.1 6.0 - 12.0 fL    Platelets 205 140 - 450 10*3/mm3    Neutrophil % 74.1 42.7 - 76.0 %    Lymphocyte % 10.8 (L) 19.6 - 45.3 %    Monocyte % 12.2 (H) 5.0 - 12.0 %    Eosinophil % 1.8 0.3 - 6.2 %    Basophil % 0.9 0.0 - 1.5 %    Immature Grans % 0.2 0.0 - 0.5 %    Neutrophils, Absolute 6.60 1.70 - 7.00 10*3/mm3    Lymphocytes, Absolute 0.96 0.70 - 3.10 10*3/mm3    Monocytes, Absolute 1.09 (H) 0.10 - 0.90 10*3/mm3    Eosinophils, Absolute 0.16 0.00 - 0.40 10*3/mm3    Basophils, Absolute 0.08 0.00 - 0.20 10*3/mm3    Immature Grans, Absolute 0.02 0.00 - 0.05 10*3/mm3    nRBC 0.0 0.0 - 0.2 /100 WBC   ECG 12 Lead Other;  weakness   Result Value Ref Range    QT Interval 436 ms    QTC Interval 436 ms   Gold Top - SST   Result Value Ref Range    Extra Tube Hold for add-ons.    Light Blue Top   Result Value Ref Range    Extra Tube Hold for add-ons.      XR Chest 1 View   Final Result   No acute process      Electronically signed by:  Gianfranco Grider MD  1/15/2023 6:24 PM CST   Workstation: [id number]                Medical Decision Making  Vital signs are stable, afebrile.  Orthostatics negative.  Labs are unremarkable.  2+ blood and 1+ leukocytes on UA.  Chest x-ray shows no acute cardiopulmonary processes.  Patient received IVF bolus.  She is feeling better on reevaluation.  Recommend follow-up with her PCP.  Return precautions given.  Patient states understanding and is agreeable to the plan.    Lightheaded: acute illness or injury  Amount and/or Complexity of Data Reviewed  Labs: ordered. Decision-making details documented in ED Course.  Radiology: ordered. Decision-making details documented in ED Course.  ECG/medicine tests: ordered and independent interpretation performed.          Final diagnoses:   Lightheaded       ED Disposition  ED Disposition     ED Disposition   Discharge    Condition   Stable    Comment   --             Frandy Burger MD  [address] [place], [place] [address]  [phone]    Schedule an appointment as soon as possible for a visit in 2 days  ER follow up         Medication List      No changes were made to your prescriptions during this visit.          Nav Ritchie MD  01/15/23 7977

## 2023-01-28 LAB
QT INTERVAL: 436 MS
QTC INTERVAL: 436 MS

## 2023-04-18 ENCOUNTER — APPOINTMENT (OUTPATIENT)
Dept: ULTRASOUND IMAGING | Facility: HOSPITAL | Age: 73
End: 2023-04-18
Payer: MEDICARE

## 2023-04-18 ENCOUNTER — APPOINTMENT (OUTPATIENT)
Dept: CT IMAGING | Facility: HOSPITAL | Age: 73
End: 2023-04-18
Payer: MEDICARE

## 2023-04-18 ENCOUNTER — HOSPITAL ENCOUNTER (EMERGENCY)
Facility: HOSPITAL | Age: 73
Discharge: HOME | End: 2023-04-18
Attending: FAMILY MEDICINE | Admitting: EMERGENCY MEDICINE
Payer: MEDICARE

## 2023-04-18 VITALS
HEIGHT: 66 IN | TEMPERATURE: 98.3 F | RESPIRATION RATE: 18 BRPM | OXYGEN SATURATION: 92 % | WEIGHT: 180 LBS | HEART RATE: 89 BPM | BODY MASS INDEX: 28.93 KG/M2 | SYSTOLIC BLOOD PRESSURE: 162 MMHG | DIASTOLIC BLOOD PRESSURE: 79 MMHG

## 2023-04-18 DIAGNOSIS — K80.20 CALCULUS OF GALLBLADDER WITHOUT CHOLECYSTITIS WITHOUT OBSTRUCTION: Primary | ICD-10-CM

## 2023-04-18 LAB
ALBUMIN SERPL-MCNC: 3.7 G/DL (ref 3.5–5.2)
ALBUMIN/GLOB SERPL: 1.2 G/DL
ALP SERPL-CCNC: 27 U/L (ref 39–117)
ALT SERPL W P-5'-P-CCNC: 13 U/L (ref 1–33)
ANION GAP SERPL CALCULATED.3IONS-SCNC: 12 MMOL/L (ref 5–15)
AST SERPL-CCNC: 30 U/L (ref 1–32)
BACTERIA UR QL AUTO: ABNORMAL /HPF
BASOPHILS # BLD AUTO: 0.06 10*3/MM3 (ref 0–0.2)
BASOPHILS NFR BLD AUTO: 0.8 % (ref 0–1.5)
BILIRUB SERPL-MCNC: 0.7 MG/DL (ref 0–1.2)
BILIRUB UR QL STRIP: NEGATIVE
BUN SERPL-MCNC: 14 MG/DL (ref 8–23)
BUN/CREAT SERPL: 15.7 (ref 7–25)
CALCIUM SPEC-SCNC: 8.7 MG/DL (ref 8.6–10.5)
CHLORIDE SERPL-SCNC: 104 MMOL/L (ref 98–107)
CLARITY UR: CLEAR
CO2 SERPL-SCNC: 23 MMOL/L (ref 22–29)
COLOR UR: YELLOW
CREAT SERPL-MCNC: 0.89 MG/DL (ref 0.57–1)
D-LACTATE SERPL-SCNC: 1.8 MMOL/L (ref 0.5–2)
DEPRECATED RDW RBC AUTO: 43.8 FL (ref 37–54)
EGFRCR SERPLBLD CKD-EPI 2021: 69.4 ML/MIN/1.73
EOSINOPHIL # BLD AUTO: 0.13 10*3/MM3 (ref 0–0.4)
EOSINOPHIL NFR BLD AUTO: 1.8 % (ref 0.3–6.2)
ERYTHROCYTE [DISTWIDTH] IN BLOOD BY AUTOMATED COUNT: 13.2 % (ref 12.3–15.4)
GLOBULIN UR ELPH-MCNC: 3.2 GM/DL
GLUCOSE SERPL-MCNC: 150 MG/DL (ref 65–99)
GLUCOSE UR STRIP-MCNC: NEGATIVE MG/DL
HCT VFR BLD AUTO: 36.3 % (ref 34–46.6)
HGB BLD-MCNC: 12.5 G/DL (ref 12–15.9)
HGB UR QL STRIP.AUTO: NEGATIVE
HOLD SPECIMEN: NORMAL
HOLD SPECIMEN: NORMAL
HYALINE CASTS UR QL AUTO: ABNORMAL /LPF
IMM GRANULOCYTES # BLD AUTO: 0.01 10*3/MM3 (ref 0–0.05)
IMM GRANULOCYTES NFR BLD AUTO: 0.1 % (ref 0–0.5)
KETONES UR QL STRIP: ABNORMAL
LEUKOCYTE ESTERASE UR QL STRIP.AUTO: ABNORMAL
LIPASE SERPL-CCNC: 32 U/L (ref 13–60)
LYMPHOCYTES # BLD AUTO: 0.17 10*3/MM3 (ref 0.7–3.1)
LYMPHOCYTES NFR BLD AUTO: 2.4 % (ref 19.6–45.3)
MCH RBC QN AUTO: 31.1 PG (ref 26.6–33)
MCHC RBC AUTO-ENTMCNC: 34.4 G/DL (ref 31.5–35.7)
MCV RBC AUTO: 90.3 FL (ref 79–97)
MONOCYTES # BLD AUTO: 0.44 10*3/MM3 (ref 0.1–0.9)
MONOCYTES NFR BLD AUTO: 6.1 % (ref 5–12)
NEUTROPHILS NFR BLD AUTO: 6.41 10*3/MM3 (ref 1.7–7)
NEUTROPHILS NFR BLD AUTO: 88.8 % (ref 42.7–76)
NITRITE UR QL STRIP: NEGATIVE
NRBC BLD AUTO-RTO: 0 /100 WBC (ref 0–0.2)
PH UR STRIP.AUTO: 8 [PH] (ref 5–9)
PLATELET # BLD AUTO: 256 10*3/MM3 (ref 140–450)
PMV BLD AUTO: 10.6 FL (ref 6–12)
POTASSIUM SERPL-SCNC: 3.9 MMOL/L (ref 3.5–5.2)
PROT SERPL-MCNC: 6.9 G/DL (ref 6–8.5)
PROT UR QL STRIP: ABNORMAL
RBC # BLD AUTO: 4.02 10*6/MM3 (ref 3.77–5.28)
RBC # UR STRIP: ABNORMAL /HPF
REF LAB TEST METHOD: ABNORMAL
SODIUM SERPL-SCNC: 139 MMOL/L (ref 136–145)
SP GR UR STRIP: 1.03 (ref 1–1.03)
SQUAMOUS #/AREA URNS HPF: ABNORMAL /HPF
UROBILINOGEN UR QL STRIP: ABNORMAL
WBC # UR STRIP: ABNORMAL /HPF
WBC NRBC COR # BLD: 7.22 10*3/MM3 (ref 3.4–10.8)
WHOLE BLOOD HOLD COAG: NORMAL
WHOLE BLOOD HOLD SPECIMEN: NORMAL

## 2023-04-18 PROCEDURE — 99284 EMERGENCY DEPT VISIT MOD MDM: CPT

## 2023-04-18 PROCEDURE — 80053 COMPREHEN METABOLIC PANEL: CPT

## 2023-04-18 PROCEDURE — 25010000002 ONDANSETRON PER 1 MG: Performed by: FAMILY MEDICINE

## 2023-04-18 PROCEDURE — 81001 URINALYSIS AUTO W/SCOPE: CPT

## 2023-04-18 PROCEDURE — 96374 THER/PROPH/DIAG INJ IV PUSH: CPT

## 2023-04-18 PROCEDURE — 76705 ECHO EXAM OF ABDOMEN: CPT

## 2023-04-18 PROCEDURE — 83690 ASSAY OF LIPASE: CPT

## 2023-04-18 PROCEDURE — 25010000002 MORPHINE PER 10 MG: Performed by: EMERGENCY MEDICINE

## 2023-04-18 PROCEDURE — 83605 ASSAY OF LACTIC ACID: CPT

## 2023-04-18 PROCEDURE — 74177 CT ABD & PELVIS W/CONTRAST: CPT

## 2023-04-18 PROCEDURE — 96375 TX/PRO/DX INJ NEW DRUG ADDON: CPT

## 2023-04-18 PROCEDURE — 25510000001 IOPAMIDOL 61 % SOLUTION: Performed by: EMERGENCY MEDICINE

## 2023-04-18 PROCEDURE — 85025 COMPLETE CBC W/AUTO DIFF WBC: CPT

## 2023-04-18 PROCEDURE — 25010000002 METOCLOPRAMIDE PER 10 MG

## 2023-04-18 PROCEDURE — 96376 TX/PRO/DX INJ SAME DRUG ADON: CPT

## 2023-04-18 PROCEDURE — 25010000002 ONDANSETRON PER 1 MG

## 2023-04-18 RX ORDER — METOCLOPRAMIDE HYDROCHLORIDE 5 MG/ML
10 INJECTION INTRAMUSCULAR; INTRAVENOUS ONCE
Status: COMPLETED | OUTPATIENT
Start: 2023-04-18 | End: 2023-04-18

## 2023-04-18 RX ORDER — SODIUM CHLORIDE 0.9 % (FLUSH) 0.9 %
10 SYRINGE (ML) INJECTION AS NEEDED
Status: DISCONTINUED | OUTPATIENT
Start: 2023-04-18 | End: 2023-04-18 | Stop reason: HOSPADM

## 2023-04-18 RX ORDER — ONDANSETRON 2 MG/ML
4 INJECTION INTRAMUSCULAR; INTRAVENOUS ONCE
Status: COMPLETED | OUTPATIENT
Start: 2023-04-18 | End: 2023-04-18

## 2023-04-18 RX ORDER — MORPHINE SULFATE 2 MG/ML
2 INJECTION, SOLUTION INTRAMUSCULAR; INTRAVENOUS ONCE
Status: COMPLETED | OUTPATIENT
Start: 2023-04-18 | End: 2023-04-18

## 2023-04-18 RX ORDER — ONDANSETRON 4 MG/1
4 TABLET, ORALLY DISINTEGRATING ORAL EVERY 6 HOURS PRN
Qty: 20 TABLET | Refills: 0 | Status: SHIPPED | OUTPATIENT
Start: 2023-04-18 | End: 2023-04-23

## 2023-04-18 RX ADMIN — SODIUM CHLORIDE 1000 ML: 9 INJECTION, SOLUTION INTRAVENOUS at 18:04

## 2023-04-18 RX ADMIN — MORPHINE SULFATE 2 MG: 2 INJECTION, SOLUTION INTRAMUSCULAR; INTRAVENOUS at 18:04

## 2023-04-18 RX ADMIN — ONDANSETRON 4 MG: 2 INJECTION INTRAMUSCULAR; INTRAVENOUS at 16:48

## 2023-04-18 RX ADMIN — ONDANSETRON 4 MG: 2 INJECTION INTRAMUSCULAR; INTRAVENOUS at 19:19

## 2023-04-18 RX ADMIN — METOCLOPRAMIDE 10 MG: 5 INJECTION, SOLUTION INTRAMUSCULAR; INTRAVENOUS at 20:32

## 2023-04-18 RX ADMIN — IOPAMIDOL 90 ML: 612 INJECTION, SOLUTION INTRAVENOUS at 17:49

## 2023-04-18 NOTE — Clinical Note
[facility] EMERGENCY DEPARTMENT  [address] [place] [id number]  Phone: [phone]    Bushra Tidwell was seen and treated in our emergency department on 4/18/2023.  She may return to work on 04/23/2023.         Thank you for choosing [facility].    Omkar Lang MD

## 2023-04-18 NOTE — ED PROVIDER NOTES
"Subjective   History of Present Illness  73 year old female patient presents to ER for complaint of vomiting, diarrhea, and epigastric pain that radiates into her back that started early this AM. She had a similar episode about one month ago and was diagnosed with gallstones. She is rating her pain 5/10 at present. She denies fever or urinary symptoms. She denies tobacco use, drinks ETOH occasionally but not in past month, and denies illicit drug use.        Review of Systems   Constitutional: Negative.  Negative for fever.   HENT: Negative.    Eyes: Negative.    Respiratory: Negative.    Cardiovascular: Negative.    Gastrointestinal: Positive for abdominal pain, diarrhea, nausea and vomiting.   Endocrine: Negative.    Genitourinary: Negative.  Negative for dysuria and urgency.   Musculoskeletal: Positive for back pain.   Skin: Negative.    Allergic/Immunologic: Negative.    Neurological: Negative.    Hematological: Negative.    Psychiatric/Behavioral: Negative.        Past Medical History:   Diagnosis Date   • Arthritis     B knees   • Cancer 2015    skin cancer       Allergies   Allergen Reactions   • Molds & Smuts Shortness Of Breath   • Cefuroxime Axetil Rash       Past Surgical History:   Procedure Laterality Date   • APPENDECTOMY     • BILATERAL BREAST REDUCTION Bilateral    • BREAST SURGERY Bilateral     breast reduction   • HERNIA REPAIR      6   • HYSTERECTOMY     • SKIN BIOPSY         History reviewed. No pertinent family history.    Social History     Socioeconomic History   • Marital status:    Tobacco Use   • Smoking status: Never   Substance and Sexual Activity   • Alcohol use: Yes     Comment: occasional   • Drug use: Defer   • Sexual activity: Defer           Objective    /86 (BP Location: Right arm, Patient Position: Lying)   Pulse 86   Temp 98.3 °F (36.8 °C)   Resp 18   Ht 167.6 cm (66\")   Wt 81.6 kg (180 lb)   SpO2 94%   BMI 29.05 kg/m²     Physical Exam  Vitals and nursing " note reviewed.   Constitutional:       General: She is not in acute distress.     Appearance: Normal appearance. She is not ill-appearing, toxic-appearing or diaphoretic.   HENT:      Head: Normocephalic.      Nose: Nose normal.      Mouth/Throat:      Mouth: Mucous membranes are moist.   Eyes:      Pupils: Pupils are equal, round, and reactive to light.   Cardiovascular:      Rate and Rhythm: Normal rate and regular rhythm.      Pulses: Normal pulses.      Heart sounds: Normal heart sounds.   Pulmonary:      Effort: Pulmonary effort is normal.      Breath sounds: Normal breath sounds.   Abdominal:      General: Bowel sounds are normal. There is no distension.      Palpations: Abdomen is soft.      Tenderness: There is abdominal tenderness in the right upper quadrant and epigastric area. There is guarding. Positive signs include Choi's sign.   Musculoskeletal:         General: Normal range of motion.      Cervical back: Normal range of motion.   Skin:     General: Skin is warm and dry.      Capillary Refill: Capillary refill takes less than 2 seconds.   Neurological:      Mental Status: She is alert and oriented to person, place, and time.   Psychiatric:         Mood and Affect: Mood normal.         Behavior: Behavior normal.         Thought Content: Thought content normal.         Judgment: Judgment normal.         Procedures           ED Course  ED Course as of 04/18/23 2012 Tue Apr 18, 2023 1911 Spoke with Dr. Temple who is agreeable to outpatient follow up. Discussed results with patient about her results and plan for discharge. She verbalizes understanding and is agreeable with plan. [HS]      ED Course User Index  [HS] Ember Bhandari, BRITNEY            Results for orders placed or performed during the hospital encounter of 04/18/23   Comprehensive Metabolic Panel    Specimen: Blood   Result Value Ref Range    Glucose 150 (H) 65 - 99 mg/dL    BUN 14 8 - 23 mg/dL    Creatinine 0.89 0.57 - 1.00 mg/dL     Sodium 139 136 - 145 mmol/L    Potassium 3.9 3.5 - 5.2 mmol/L    Chloride 104 98 - 107 mmol/L    CO2 23.0 22.0 - 29.0 mmol/L    Calcium 8.7 8.6 - 10.5 mg/dL    Total Protein 6.9 6.0 - 8.5 g/dL    Albumin 3.7 3.5 - 5.2 g/dL    ALT (SGPT) 13 1 - 33 U/L    AST (SGOT) 30 1 - 32 U/L    Alkaline Phosphatase 27 (L) 39 - 117 U/L    Total Bilirubin 0.7 0.0 - 1.2 mg/dL    Globulin 3.2 gm/dL    A/G Ratio 1.2 g/dL    BUN/Creatinine Ratio 15.7 7.0 - 25.0    Anion Gap 12.0 5.0 - 15.0 mmol/L    eGFR 69.4 >60.0 mL/min/1.73   Lipase    Specimen: Blood   Result Value Ref Range    Lipase 32 13 - 60 U/L   Lactic Acid, Plasma    Specimen: Blood   Result Value Ref Range    Lactate 1.8 0.5 - 2.0 mmol/L   Urinalysis With Microscopic If Indicated (No Culture) - Urine, Clean Catch    Specimen: Urine, Clean Catch   Result Value Ref Range    Color, UA Yellow Yellow, Straw, Dark Yellow, Diana    Appearance, UA Clear Clear    pH, UA 8.0 5.0 - 9.0    Specific Gravity, UA 1.031 (H) 1.003 - 1.030    Glucose, UA Negative Negative    Ketones, UA 15 mg/dL (1+) (A) Negative    Bilirubin, UA Negative Negative    Blood, UA Negative Negative    Protein, UA Trace (A) Negative    Leuk Esterase, UA Trace (A) Negative    Nitrite, UA Negative Negative    Urobilinogen, UA 0.2 E.U./dL 0.2 - 1.0 E.U./dL   CBC Auto Differential    Specimen: Blood   Result Value Ref Range    WBC 7.22 3.40 - 10.80 10*3/mm3    RBC 4.02 3.77 - 5.28 10*6/mm3    Hemoglobin 12.5 12.0 - 15.9 g/dL    Hematocrit 36.3 34.0 - 46.6 %    MCV 90.3 79.0 - 97.0 fL    MCH 31.1 26.6 - 33.0 pg    MCHC 34.4 31.5 - 35.7 g/dL    RDW 13.2 12.3 - 15.4 %    RDW-SD 43.8 37.0 - 54.0 fl    MPV 10.6 6.0 - 12.0 fL    Platelets 256 140 - 450 10*3/mm3    Neutrophil % 88.8 (H) 42.7 - 76.0 %    Lymphocyte % 2.4 (L) 19.6 - 45.3 %    Monocyte % 6.1 5.0 - 12.0 %    Eosinophil % 1.8 0.3 - 6.2 %    Basophil % 0.8 0.0 - 1.5 %    Immature Grans % 0.1 0.0 - 0.5 %    Neutrophils, Absolute 6.41 1.70 - 7.00 10*3/mm3     Lymphocytes, Absolute 0.17 (L) 0.70 - 3.10 10*3/mm3    Monocytes, Absolute 0.44 0.10 - 0.90 10*3/mm3    Eosinophils, Absolute 0.13 0.00 - 0.40 10*3/mm3    Basophils, Absolute 0.06 0.00 - 0.20 10*3/mm3    Immature Grans, Absolute 0.01 0.00 - 0.05 10*3/mm3    nRBC 0.0 0.0 - 0.2 /100 WBC   Urinalysis, Microscopic Only - Urine, Clean Catch    Specimen: Urine, Clean Catch   Result Value Ref Range    RBC, UA 3-5 (A) None Seen /HPF    WBC, UA 3-5 None Seen, 0-2, 3-5 /HPF    Bacteria, UA None Seen None Seen /HPF    Squamous Epithelial Cells, UA 0-2 None Seen, 0-2 /HPF    Hyaline Casts, UA 0-2 None Seen /LPF    Methodology Automated Microscopy    Green Top (Gel)   Result Value Ref Range    Extra Tube Hold for add-ons.    Lavender Top   Result Value Ref Range    Extra Tube hold for add-on    Gold Top - SST   Result Value Ref Range    Extra Tube Hold for add-ons.    Light Blue Top   Result Value Ref Range    Extra Tube Hold for add-ons.            CT Abdomen Pelvis With Contrast    Result Date: 4/18/2023  HISTORY: Epigastric pain and vomiting. COMPARISON: Ultrasound from today.  CT abdomen and pelvis, 12/29/2021. TECHNIQUE: Helical imaging from the lung bases through the pubic symphysis after administration of intravenous iodinated contrast material.  Axial, coronal and sagittal reconstructed 2-D images were provided.  Oral contrast was not administered. FINDINGS: Lung bases: The included lung bases are clear. Liver: Tiny possible cyst near the inferior margin of the left hepatic lobe is too small to characterize. Gallbladder and biliary tree: The gallbladder is slightly distended, but there are no pericholecystic inflammatory changes.  The stone seen on ultrasound are not radiopaque. Pancreas: Normal Spleen: Normal Genitourinary: Left adrenal nodule has decreased since previous and now measures about 2 cm.  Therefore, this could have represented an adrenal hemorrhage previously. Retroperitoneum: The aorta and inferior vena  cava are normal in caliber. Bowel: Small hiatal hernia.  Bowel is normal in caliber.  There is mild lipomatous hypertrophy of the ileocecal valve.  Sigmoid colon diverticulosis is present. Peritoneum: No ascites. Pelvis: Indeterminate low-density lesion medial to the left acetabular wall measuring up to about 2.5 cm which was not seen on prior exam. Osseous structures: No acute osseous abnormality.  Multilevel degenerative change.     1.  The gallstones shown on ultrasound are not radiopaque.  There is slight gallbladder distention but no other evidence of acute cholecystitis. 2.  Decreasing left adrenal nodule which may have represented an adrenal hemorrhage on the prior study as there was also some soft tissue stranding in this vicinity. 3.  Distal colonic diverticulosis. 4.  Large varices in the mesentery and left hemipelvis. 5.  Indeterminate cystic or necrotic structure along the right pelvic sidewall could reflect a necrotic lymph node.  No additional pathologically enlarged lymph nodes are seen, however.    US Gallbladder    Result Date: 4/18/2023  RIGHT UPPER QUADRANT ULTRASOUND HISTORY: Right upper quadrant pain. COMPARISON: None. TECHNIQUE: Real-time ultrasound imaging and limited color Doppler were used to evaluate the liver, biliary tree, gallbladder, pancreas and right kidney. FINDINGS: Liver: The liver is normal in size and echotexture without focal lesion.  Main portal vein is patent with antegrade flow. The liver measures 12 cm. Gallbladder and bile ducts: Multiple stones. The common bile duct measures 3-4 mm.  Positive sonographic Choi's sign. Pancreas: Normal as visualized. Right kidney: Normal. The right kidney measures 9.9 cm. The visualized aorta and IVC are normal in caliber.     Cholelithiasis.                                 MDM    Final diagnoses:   Calculus of gallbladder without cholecystitis without obstruction       ED Disposition  ED Disposition     ED Disposition   Discharge     Condition   Stable    Comment   --             Bishop Temple MD  [address] [place] [address] [address]  [place] [address]  [phone]    Call   ER follow up, call for appointment         Where to Get Your Medications      These medications were sent to [facility] [id number] - [address] [place] AT [facility] [place] [address] - [phone]  - [phone] [address] [place], [place] [address]    Phone: [phone]   · ondansetron ODT 4 MG disintegrating tablet        Medication List      No changes were made to your prescriptions during this visit.          Ember Bhandari, APRN  04/18/23 2012

## 2023-04-19 NOTE — DISCHARGE INSTRUCTIONS
Home to rest. Take zofran as needed for nausea. Clear liquid diet, advance to bland diet as tolerated. Follow up with Dr. Temple, call number provided to schedule appointment. Return for worsening symptoms.

## 2023-04-20 ENCOUNTER — OFFICE VISIT (OUTPATIENT)
Dept: SURGERY | Facility: CLINIC | Age: 73
End: 2023-04-20
Payer: MEDICARE

## 2023-04-20 VITALS
OXYGEN SATURATION: 93 % | HEIGHT: 66 IN | DIASTOLIC BLOOD PRESSURE: 80 MMHG | HEART RATE: 65 BPM | BODY MASS INDEX: 29.35 KG/M2 | TEMPERATURE: 97.9 F | WEIGHT: 182.6 LBS | SYSTOLIC BLOOD PRESSURE: 140 MMHG

## 2023-04-20 DIAGNOSIS — R11.10 ABDOMINAL PAIN, VOMITING, AND DIARRHEA: ICD-10-CM

## 2023-04-20 DIAGNOSIS — R10.9 ABDOMINAL PAIN, VOMITING, AND DIARRHEA: ICD-10-CM

## 2023-04-20 DIAGNOSIS — K80.20 CALCULUS OF GALLBLADDER WITHOUT CHOLECYSTITIS WITHOUT OBSTRUCTION: Primary | ICD-10-CM

## 2023-04-20 DIAGNOSIS — R19.7 ABDOMINAL PAIN, VOMITING, AND DIARRHEA: ICD-10-CM

## 2023-04-20 RX ORDER — ALPRAZOLAM 0.25 MG/1
0.25 TABLET ORAL DAILY
COMMUNITY

## 2023-04-20 RX ORDER — LIDOCAINE 50 MG/G
PATCH TOPICAL AS NEEDED
COMMUNITY
Start: 2022-11-08

## 2023-04-20 RX ORDER — MULTIPLE VITAMINS W/ MINERALS TAB 9MG-400MCG
TAB ORAL
COMMUNITY
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
## 2023-04-24 ENCOUNTER — OFFICE VISIT (OUTPATIENT)
Dept: SURGERY | Facility: CLINIC | Age: 73
End: 2023-04-24
Payer: MEDICARE

## 2023-04-24 VITALS
TEMPERATURE: 98 F | BODY MASS INDEX: 29.15 KG/M2 | HEART RATE: 92 BPM | DIASTOLIC BLOOD PRESSURE: 80 MMHG | SYSTOLIC BLOOD PRESSURE: 124 MMHG | WEIGHT: 181.4 LBS | HEIGHT: 66 IN | OXYGEN SATURATION: 96 %

## 2023-04-24 DIAGNOSIS — K80.20 CALCULUS OF GALLBLADDER WITHOUT CHOLECYSTITIS WITHOUT OBSTRUCTION: Primary | ICD-10-CM

## 2023-04-24 NOTE — LETTER
April 24, 2023     Patient: Bushra Tidwell   YOB: 1950   Date of Visit: 4/24/2023       To Whom It May Concern:    It is my medical opinion that Bushra Tidwell can return tomorrow, without restrictions.           Sincerely,        Bishop Temple MD    CC:   No Recipients
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
## 2023-06-09 ENCOUNTER — HOSPITAL ENCOUNTER (EMERGENCY)
Facility: HOSPITAL | Age: 73
Discharge: HOME | End: 2023-06-09
Attending: FAMILY MEDICINE
Payer: MEDICARE

## 2023-06-09 ENCOUNTER — APPOINTMENT (OUTPATIENT)
Dept: GENERAL RADIOLOGY | Facility: HOSPITAL | Age: 73
End: 2023-06-09
Payer: MEDICARE

## 2023-06-09 VITALS
HEART RATE: 59 BPM | SYSTOLIC BLOOD PRESSURE: 159 MMHG | HEIGHT: 66 IN | RESPIRATION RATE: 18 BRPM | DIASTOLIC BLOOD PRESSURE: 73 MMHG | WEIGHT: 178 LBS | OXYGEN SATURATION: 96 % | TEMPERATURE: 97.7 F | BODY MASS INDEX: 28.61 KG/M2

## 2023-06-09 DIAGNOSIS — I95.9 HYPOTENSION, UNSPECIFIED HYPOTENSION TYPE: ICD-10-CM

## 2023-06-09 DIAGNOSIS — R31.9 URINARY TRACT INFECTION WITH HEMATURIA, SITE UNSPECIFIED: ICD-10-CM

## 2023-06-09 DIAGNOSIS — R55 SYNCOPE, UNSPECIFIED SYNCOPE TYPE: Primary | ICD-10-CM

## 2023-06-09 DIAGNOSIS — N39.0 URINARY TRACT INFECTION WITH HEMATURIA, SITE UNSPECIFIED: ICD-10-CM

## 2023-06-09 LAB
ALBUMIN SERPL-MCNC: 3.7 G/DL (ref 3.5–5.2)
ALBUMIN/GLOB SERPL: 1.1 G/DL
ALP SERPL-CCNC: 33 U/L (ref 39–117)
ALT SERPL W P-5'-P-CCNC: 13 U/L (ref 1–33)
ANION GAP SERPL CALCULATED.3IONS-SCNC: 12 MMOL/L (ref 5–15)
AST SERPL-CCNC: 27 U/L (ref 1–32)
BACTERIA UR QL AUTO: ABNORMAL /HPF
BASOPHILS # BLD AUTO: 0.13 10*3/MM3 (ref 0–0.2)
BASOPHILS NFR BLD AUTO: 1.3 % (ref 0–1.5)
BILIRUB SERPL-MCNC: 0.4 MG/DL (ref 0–1.2)
BILIRUB UR QL STRIP: NEGATIVE
BUN SERPL-MCNC: 14 MG/DL (ref 8–23)
BUN/CREAT SERPL: 12.2 (ref 7–25)
CALCIUM SPEC-SCNC: 9.5 MG/DL (ref 8.6–10.5)
CHLORIDE SERPL-SCNC: 101 MMOL/L (ref 98–107)
CK SERPL-CCNC: 34 U/L (ref 20–180)
CLARITY UR: ABNORMAL
CO2 SERPL-SCNC: 27 MMOL/L (ref 22–29)
COLOR UR: ABNORMAL
CREAT SERPL-MCNC: 1.15 MG/DL (ref 0.57–1)
DEPRECATED RDW RBC AUTO: 44.7 FL (ref 37–54)
EGFRCR SERPLBLD CKD-EPI 2021: 51 ML/MIN/1.73
EOSINOPHIL # BLD AUTO: 0.24 10*3/MM3 (ref 0–0.4)
EOSINOPHIL NFR BLD AUTO: 2.5 % (ref 0.3–6.2)
ERYTHROCYTE [DISTWIDTH] IN BLOOD BY AUTOMATED COUNT: 13.2 % (ref 12.3–15.4)
GEN 5 2HR TROPONIN T REFLEX: 14 NG/L
GLOBULIN UR ELPH-MCNC: 3.3 GM/DL
GLUCOSE SERPL-MCNC: 113 MG/DL (ref 65–99)
GLUCOSE UR STRIP-MCNC: NEGATIVE MG/DL
HCT VFR BLD AUTO: 35 % (ref 34–46.6)
HGB BLD-MCNC: 11.5 G/DL (ref 12–15.9)
HGB UR QL STRIP.AUTO: ABNORMAL
HOLD SPECIMEN: NORMAL
HYALINE CASTS UR QL AUTO: ABNORMAL /LPF
IMM GRANULOCYTES # BLD AUTO: 0.03 10*3/MM3 (ref 0–0.05)
IMM GRANULOCYTES NFR BLD AUTO: 0.3 % (ref 0–0.5)
KETONES UR QL STRIP: ABNORMAL
LEUKOCYTE ESTERASE UR QL STRIP.AUTO: ABNORMAL
LYMPHOCYTES # BLD AUTO: 1.85 10*3/MM3 (ref 0.7–3.1)
LYMPHOCYTES NFR BLD AUTO: 19.1 % (ref 19.6–45.3)
MAGNESIUM SERPL-MCNC: 1.9 MG/DL (ref 1.6–2.4)
MCH RBC QN AUTO: 30 PG (ref 26.6–33)
MCHC RBC AUTO-ENTMCNC: 32.9 G/DL (ref 31.5–35.7)
MCV RBC AUTO: 91.4 FL (ref 79–97)
MONOCYTES # BLD AUTO: 0.94 10*3/MM3 (ref 0.1–0.9)
MONOCYTES NFR BLD AUTO: 9.7 % (ref 5–12)
NEUTROPHILS NFR BLD AUTO: 6.5 10*3/MM3 (ref 1.7–7)
NEUTROPHILS NFR BLD AUTO: 67.1 % (ref 42.7–76)
NITRITE UR QL STRIP: NEGATIVE
NRBC BLD AUTO-RTO: 0 /100 WBC (ref 0–0.2)
NT-PROBNP SERPL-MCNC: 329.3 PG/ML (ref 0–900)
PH UR STRIP.AUTO: 5.5 [PH] (ref 5–9)
PLATELET # BLD AUTO: 308 10*3/MM3 (ref 140–450)
PMV BLD AUTO: 10.2 FL (ref 6–12)
POTASSIUM SERPL-SCNC: 3.8 MMOL/L (ref 3.5–5.2)
PROT SERPL-MCNC: 7 G/DL (ref 6–8.5)
PROT UR QL STRIP: ABNORMAL
QT INTERVAL: 426 MS
QTC INTERVAL: 439 MS
RBC # BLD AUTO: 3.83 10*6/MM3 (ref 3.77–5.28)
RBC # UR STRIP: ABNORMAL /HPF
REF LAB TEST METHOD: ABNORMAL
SODIUM SERPL-SCNC: 140 MMOL/L (ref 136–145)
SP GR UR STRIP: 1.01 (ref 1–1.03)
SQUAMOUS #/AREA URNS HPF: ABNORMAL /HPF
TROPONIN T DELTA: -2 NG/L
TROPONIN T SERPL HS-MCNC: 16 NG/L
UROBILINOGEN UR QL STRIP: ABNORMAL
WBC # UR STRIP: ABNORMAL /HPF
WBC NRBC COR # BLD: 9.69 10*3/MM3 (ref 3.4–10.8)
WHOLE BLOOD HOLD COAG: NORMAL

## 2023-06-09 PROCEDURE — 83735 ASSAY OF MAGNESIUM: CPT | Performed by: FAMILY MEDICINE

## 2023-06-09 PROCEDURE — 36415 COLL VENOUS BLD VENIPUNCTURE: CPT

## 2023-06-09 PROCEDURE — 93010 ELECTROCARDIOGRAM REPORT: CPT | Performed by: INTERNAL MEDICINE

## 2023-06-09 PROCEDURE — 71045 X-RAY EXAM CHEST 1 VIEW: CPT

## 2023-06-09 PROCEDURE — 82550 ASSAY OF CK (CPK): CPT | Performed by: FAMILY MEDICINE

## 2023-06-09 PROCEDURE — 83880 ASSAY OF NATRIURETIC PEPTIDE: CPT | Performed by: FAMILY MEDICINE

## 2023-06-09 PROCEDURE — 93005 ELECTROCARDIOGRAM TRACING: CPT | Performed by: FAMILY MEDICINE

## 2023-06-09 PROCEDURE — 85025 COMPLETE CBC W/AUTO DIFF WBC: CPT | Performed by: FAMILY MEDICINE

## 2023-06-09 PROCEDURE — 87088 URINE BACTERIA CULTURE: CPT | Performed by: FAMILY MEDICINE

## 2023-06-09 PROCEDURE — 80053 COMPREHEN METABOLIC PANEL: CPT | Performed by: FAMILY MEDICINE

## 2023-06-09 PROCEDURE — 99284 EMERGENCY DEPT VISIT MOD MDM: CPT

## 2023-06-09 PROCEDURE — 87186 SC STD MICRODIL/AGAR DIL: CPT | Performed by: FAMILY MEDICINE

## 2023-06-09 PROCEDURE — 84484 ASSAY OF TROPONIN QUANT: CPT | Performed by: FAMILY MEDICINE

## 2023-06-09 PROCEDURE — 81001 URINALYSIS AUTO W/SCOPE: CPT | Performed by: FAMILY MEDICINE

## 2023-06-09 PROCEDURE — 87086 URINE CULTURE/COLONY COUNT: CPT | Performed by: FAMILY MEDICINE

## 2023-06-09 RX ORDER — ONDANSETRON 4 MG/1
4 TABLET, ORALLY DISINTEGRATING ORAL EVERY 6 HOURS PRN
Qty: 10 TABLET | Refills: 0 | Status: SHIPPED | OUTPATIENT
Start: 2023-06-09

## 2023-06-09 RX ORDER — NITROFURANTOIN 25; 75 MG/1; MG/1
100 CAPSULE ORAL 2 TIMES DAILY
Qty: 14 CAPSULE | Refills: 0 | Status: SHIPPED | OUTPATIENT
Start: 2023-06-09 | End: 2023-06-16

## 2023-06-09 RX ORDER — NITROFURANTOIN 25; 75 MG/1; MG/1
100 CAPSULE ORAL ONCE
Status: COMPLETED | OUTPATIENT
Start: 2023-06-09 | End: 2023-06-09

## 2023-06-09 RX ORDER — SODIUM CHLORIDE 9 MG/ML
125 INJECTION, SOLUTION INTRAVENOUS CONTINUOUS
Status: DISCONTINUED | OUTPATIENT
Start: 2023-06-09 | End: 2023-06-10 | Stop reason: HOSPADM

## 2023-06-09 RX ADMIN — NITROFURANTOIN MONOHYDRATE/MACROCRYSTALLINE 100 MG: 25; 75 CAPSULE ORAL at 23:01

## 2023-06-09 RX ADMIN — SODIUM CHLORIDE 1000 ML: 9 INJECTION, SOLUTION INTRAVENOUS at 20:25

## 2023-06-09 NOTE — Clinical Note
[facility] EMERGENCY DEPARTMENT  [address] [place] [id number]  Phone: [phone]    Bushra Tidwell was seen and treated in our emergency department on 6/9/2023.  She may return to work on 06/12/2023.  May return to work sooner if feeling better.       Thank you for choosing [facility].    Aaron Davila MD
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
## 2023-06-11 LAB — BACTERIA SPEC AEROBE CULT: ABNORMAL

## 2023-08-14 ENCOUNTER — OFFICE VISIT (OUTPATIENT)
Dept: CARDIOLOGY | Facility: CLINIC | Age: 73
End: 2023-08-14
Payer: MEDICARE

## 2023-08-14 VITALS
WEIGHT: 176.1 LBS | BODY MASS INDEX: 28.3 KG/M2 | SYSTOLIC BLOOD PRESSURE: 138 MMHG | DIASTOLIC BLOOD PRESSURE: 78 MMHG | HEART RATE: 72 BPM | HEIGHT: 66 IN | OXYGEN SATURATION: 98 %

## 2023-08-14 DIAGNOSIS — I31.39 PERICARDIAL EFFUSION: ICD-10-CM

## 2023-08-14 DIAGNOSIS — I34.0 MITRAL VALVE INSUFFICIENCY, UNSPECIFIED ETIOLOGY: ICD-10-CM

## 2023-08-14 DIAGNOSIS — I07.1 TRICUSPID VALVE INSUFFICIENCY, UNSPECIFIED ETIOLOGY: ICD-10-CM

## 2023-08-14 DIAGNOSIS — I51.7 ENLARGED RV (RIGHT VENTRICLE): ICD-10-CM

## 2023-08-14 DIAGNOSIS — R00.2 PALPITATIONS: ICD-10-CM

## 2023-08-14 DIAGNOSIS — I35.9 AORTIC VALVE CALCIFICATION: ICD-10-CM

## 2023-08-14 DIAGNOSIS — I49.3 PVC'S (PREMATURE VENTRICULAR CONTRACTIONS): Primary | ICD-10-CM

## 2023-08-14 PROCEDURE — 1160F RVW MEDS BY RX/DR IN RCRD: CPT | Performed by: NURSE PRACTITIONER

## 2023-08-14 PROCEDURE — 1159F MED LIST DOCD IN RCRD: CPT | Performed by: NURSE PRACTITIONER

## 2023-08-14 PROCEDURE — 99215 OFFICE O/P EST HI 40 MIN: CPT | Performed by: NURSE PRACTITIONER

## 2023-08-14 RX ORDER — METOPROLOL SUCCINATE 25 MG/1
12.5 TABLET, EXTENDED RELEASE ORAL DAILY
Qty: 90 TABLET | Refills: 3 | Status: SHIPPED | OUTPATIENT
Start: 2023-08-14

## 2023-08-14 RX ORDER — AMOXICILLIN AND CLAVULANATE POTASSIUM 875; 125 MG/1; MG/1
1 TABLET, FILM COATED ORAL
COMMUNITY
Start: 2023-08-09 | End: 2023-08-20
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
## 2023-08-22 ENCOUNTER — OFFICE VISIT (OUTPATIENT)
Dept: SLEEP MEDICINE | Facility: HOSPITAL | Age: 73
End: 2023-08-22
Payer: MEDICARE

## 2023-08-22 VITALS
WEIGHT: 178 LBS | DIASTOLIC BLOOD PRESSURE: 76 MMHG | OXYGEN SATURATION: 95 % | HEART RATE: 63 BPM | BODY MASS INDEX: 28.75 KG/M2 | SYSTOLIC BLOOD PRESSURE: 135 MMHG

## 2023-08-22 DIAGNOSIS — R06.83 SNORING: Primary | ICD-10-CM

## 2023-08-22 DIAGNOSIS — G47.10 HYPERSOMNIA: ICD-10-CM

## 2023-08-22 DIAGNOSIS — R00.2 PALPITATIONS: ICD-10-CM

## 2023-08-22 DIAGNOSIS — G47.00 FREQUENT NOCTURNAL AWAKENING: ICD-10-CM
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
## 2023-09-07 ENCOUNTER — HOSPITAL ENCOUNTER (OUTPATIENT)
Dept: SLEEP MEDICINE | Facility: HOSPITAL | Age: 73
Discharge: HOME | End: 2023-09-07
Payer: MEDICARE

## 2023-09-07 DIAGNOSIS — G47.10 HYPERSOMNIA: ICD-10-CM

## 2023-09-07 DIAGNOSIS — R06.83 SNORING: ICD-10-CM

## 2023-09-07 DIAGNOSIS — R00.2 PALPITATIONS: ICD-10-CM

## 2023-09-07 DIAGNOSIS — G47.00 FREQUENT NOCTURNAL AWAKENING: ICD-10-CM

## 2023-09-07 PROCEDURE — 95800 SLP STDY UNATTENDED: CPT

## 2023-09-22 ENCOUNTER — OFFICE VISIT (OUTPATIENT)
Dept: SLEEP MEDICINE | Facility: HOSPITAL | Age: 73
End: 2023-09-22
Payer: MEDICARE

## 2023-09-22 VITALS
HEART RATE: 62 BPM | SYSTOLIC BLOOD PRESSURE: 140 MMHG | DIASTOLIC BLOOD PRESSURE: 73 MMHG | OXYGEN SATURATION: 96 % | BODY MASS INDEX: 29.14 KG/M2 | HEIGHT: 66 IN | WEIGHT: 181.3 LBS

## 2023-09-22 DIAGNOSIS — G47.30 SLEEP APNEA IN ADULT: Primary | ICD-10-CM

## 2023-09-22 PROCEDURE — 1160F RVW MEDS BY RX/DR IN RCRD: CPT | Performed by: NURSE PRACTITIONER

## 2023-09-22 PROCEDURE — 99214 OFFICE O/P EST MOD 30 MIN: CPT | Performed by: NURSE PRACTITIONER

## 2023-09-22 PROCEDURE — 1159F MED LIST DOCD IN RCRD: CPT | Performed by: NURSE PRACTITIONER

## 2023-09-22 NOTE — PROGRESS NOTES
Sleep Clinic Follow-Up    CHIEF COMPLAINT: follow up sleep testing    LAST OV: 08/22/2023 (I reviewed this note)    HPI:  The patient is a 73 y.o. female.  I discussed the results of the recent home sleep study performed on 09/7/2023.  Total presumed sleep time was 8 hrs 55 minutes. The RDI was 7. Mean O2 92% with a sobia of 87%. No sustained hypoxia. Average pulse 58 BPM. Snoring present.       INTERVAL MEDICAL HISTORY: No change since last office visit in regard to the patient's bedtime routine, medications, or diagnosis.    PAP Data:  Currently not on therapy   Mask type:  mask fitting per DME  DME: LEGACY        MEDICATIONS:   Current Outpatient Medications:     acetaminophen (TYLENOL) 325 MG tablet, 2 tablets., Disp: , Rfl:     albuterol sulfate  (90 Base) MCG/ACT inhaler, Inhale 2 puffs., Disp: , Rfl:     ALPRAZolam (XANAX) 0.25 MG tablet, Take 1 tablet by mouth Daily., Disp: , Rfl:     Beclomethasone Diprop HFA (QVAR Redihaler) 80 MCG/ACT inhaler, Inhale 1 puff., Disp: , Rfl:     FLUoxetine (PROzac) 20 MG capsule, fluoxetine 20 mg capsule  Take 1 capsule every day by oral route., Disp: , Rfl:     levothyroxine (SYNTHROID, LEVOTHROID) 50 MCG tablet, levothyroxine 50 mcg tablet   50 micrograms by oral route., Disp: , Rfl:     metoprolol succinate XL (TOPROL-XL) 25 MG 24 hr tablet, Take 0.5 tablets by mouth Daily., Disp: 90 tablet, Rfl: 3    montelukast (SINGULAIR) 10 MG tablet, 1 tablet., Disp: , Rfl:     multivitamin with minerals tablet tablet, multivitamin, Disp: , Rfl:     rosuvastatin (CRESTOR) 10 MG tablet, 1 tablet., Disp: , Rfl:     PHYSICAL EXAM  Vitals:    09/22/23 0947   BP: 140/73   Pulse: 62   SpO2: 96%           09/22/23  0947   Weight: 82.2 kg (181 lb 4.8 oz)       Body mass index is 29.28 kg/m².  BMI is >= 25 and <30. (Overweight) The following options were offered after discussion;: nutrition counseling/recommendations       Gen:  No acute distress heard in voice, alert, oriented  Eyes:     Moist conjunctiva, EOMI   Lungs:  Normal effort, non-labored breathing  Heart:  No lower extremity edema   Psych:  Appropriate affect   Neuro:  Cn2-12 appear intact     Assessment and Plan:    Mild sleep apnea - new diagnosis, not yet on therapy   Start on APAP 5-10 cm H2O with mask fitting per DME and PAP supplies   Continue PAP as prescribed.   Monitor compliance report   Drowsy driving tips- do not drive if feeling sleepy   Return to clinic in 8 weeks with compliance report, or sooner if needed   Palpitations       The sleep results were discussed in detail with the patient.  The risks of untreated sleep apnea were reviewed.  HST diagnostic of sleep apnea based on RDI >5 criteria. Discussed CPAP and she is willing to pursue.  I counseled patient on PAP management, maintenance, compliance, sleep hygiene, including regular sleep wake schedule and stimulus control therapy, the importance of safe driving and abstaining from smoking and alcohol consumption, as well as other sedatives.       All of the patient's questions were answered. She states understanding and agreement with my assessment and plan as above.     I obtained a brief history from the patient, reviewed the medical problems and current medications, and made medical decisions regarding treatment based on that information. Total visit time 30 min, with total of 15 minutes spent counseling patient regarding: PAP therapy, PAP compliance, PAP maintenance, Lifestyle modifications, Sleep hygiene, and Study results.       Patient to follow up in 31-90 days with compliance report   She agrees to return sooner on a prn basis if sx change.           This document has been electronically signed by BRITNEY Hayes on September 22, 2023 10:07 CDT            CC: Frandy Burger MD          No ref. provider found

## 2023-09-25 ENCOUNTER — OFFICE VISIT (OUTPATIENT)
Dept: CARDIOLOGY | Facility: CLINIC | Age: 73
End: 2023-09-25
Payer: MEDICARE

## 2023-09-25 VITALS
WEIGHT: 181.7 LBS | BODY MASS INDEX: 29.2 KG/M2 | SYSTOLIC BLOOD PRESSURE: 130 MMHG | HEART RATE: 82 BPM | DIASTOLIC BLOOD PRESSURE: 78 MMHG | OXYGEN SATURATION: 98 % | HEIGHT: 66 IN

## 2023-09-25 DIAGNOSIS — I34.0 MITRAL VALVE INSUFFICIENCY, UNSPECIFIED ETIOLOGY: ICD-10-CM

## 2023-09-25 DIAGNOSIS — I31.39 PERICARDIAL EFFUSION: ICD-10-CM

## 2023-09-25 DIAGNOSIS — I07.1 TRICUSPID VALVE INSUFFICIENCY, UNSPECIFIED ETIOLOGY: ICD-10-CM

## 2023-09-25 DIAGNOSIS — I35.9 AORTIC VALVE CALCIFICATION: ICD-10-CM

## 2023-09-25 DIAGNOSIS — I49.3 PVC'S (PREMATURE VENTRICULAR CONTRACTIONS): Primary | ICD-10-CM

## 2023-09-25 DIAGNOSIS — I51.7 ENLARGED RV (RIGHT VENTRICLE): ICD-10-CM

## 2023-09-25 DIAGNOSIS — R00.2 PALPITATIONS: ICD-10-CM

## 2023-09-25 RX ORDER — TERBINAFINE HYDROCHLORIDE 250 MG/1
250 TABLET ORAL DAILY
COMMUNITY
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
